# Patient Record
Sex: FEMALE | NOT HISPANIC OR LATINO | Employment: FULL TIME | URBAN - METROPOLITAN AREA
[De-identification: names, ages, dates, MRNs, and addresses within clinical notes are randomized per-mention and may not be internally consistent; named-entity substitution may affect disease eponyms.]

---

## 2017-01-14 PROBLEM — D64.9 ANEMIA, UNSPECIFIED TYPE: Status: ACTIVE | Noted: 2017-01-14

## 2017-01-14 PROBLEM — E03.9 HYPOTHYROIDISM, UNSPECIFIED TYPE: Status: ACTIVE | Noted: 2017-01-14

## 2017-01-16 DIAGNOSIS — E11.9 TYPE 2 DIABETES MELLITUS WITHOUT COMPLICATION (H): Primary | ICD-10-CM

## 2017-01-20 ENCOUNTER — TELEPHONE (OUTPATIENT)
Dept: INTERNAL MEDICINE | Facility: CLINIC | Age: 57
End: 2017-01-20

## 2017-01-20 DIAGNOSIS — Z79.4 TYPE 2 DIABETES MELLITUS WITHOUT COMPLICATION, WITH LONG-TERM CURRENT USE OF INSULIN (H): Primary | ICD-10-CM

## 2017-01-20 DIAGNOSIS — E11.9 TYPE 2 DIABETES MELLITUS WITHOUT COMPLICATION, WITH LONG-TERM CURRENT USE OF INSULIN (H): Primary | ICD-10-CM

## 2017-01-20 RX ORDER — INSULIN GLARGINE 100 [IU]/ML
INJECTION, SOLUTION SUBCUTANEOUS
Qty: 45 ML | Refills: 0 | Status: SHIPPED | OUTPATIENT
Start: 2017-01-20 | End: 2017-03-16

## 2017-01-20 NOTE — TELEPHONE ENCOUNTER
Reason for Call:  Medication or medication refill:    Do you use a [facility]?  Name of the pharmacy and phone number for the current request:  [facility] fax [phone]    Name of the medication requested: Basaglar    Other request: [facility] pharmacy called to request a prescription for basaglar, the pharmacist said that lantus is not covered by pt insurance anymore. Please send medications requestion ASAP     Can we leave a detailed message on this number? YES    Phone number patient can be reached at: Other phone number:  [phone]    Best Time: anytime    Call taken on 1/20/2017 at 4:42 PM by LINDSEY CABRAL

## 2017-01-21 NOTE — TELEPHONE ENCOUNTER
Rx changed from Lantus to Basaglar insulin with same dosing. Pt also due for fasting labs and /u with MD in the next 1 month. Pt to schedule appts including have labs done at least 2 days prior to appt with MD so can review results when MD  Sees pt in clinic. Check blood sugars 4 times a day (before meals and bedtime) for the 3 days prior to the appointment and bring these results to the appointment.  Rx faxed, Inform pt

## 2017-03-08 DIAGNOSIS — Z79.4 TYPE 2 DIABETES MELLITUS WITHOUT COMPLICATION, WITH LONG-TERM CURRENT USE OF INSULIN (H): Primary | ICD-10-CM

## 2017-03-08 DIAGNOSIS — E11.9 TYPE 2 DIABETES MELLITUS WITHOUT COMPLICATION, WITH LONG-TERM CURRENT USE OF INSULIN (H): Primary | ICD-10-CM

## 2017-03-08 NOTE — TELEPHONE ENCOUNTER
Pt overdue for fasting labs and was a no show for recent appt. Insulin filled for 1 pack of pens  (5 pends) and no additional refill. Call pt. WIll need  Fasting lab appt and  appt with MD at least a few days after labs done before  Will do additional refills after this. Pt was also informed of of this in tele message 1/20/17

## 2017-03-08 NOTE — TELEPHONE ENCOUNTER
insulin lispro (HUMALOG KWIKPEN) 100 UNIT/ML soln         Last Written Prescription Date: 8/16/2016  Last Fill Quantity: 3mL, # refills: 4  Last Office Visit with [facility], [facility] or [facility] prescribing provider:  7/07/2016        BP Readings from Last 3 Encounters:   07/07/16 124/82   03/03/16 112/66   10/06/15 159/90     Lab Results   Component Value Date    MICROL 29 07/07/2016     No results found for: MICROALBUMIN  Creatinine   Date Value Ref Range Status   07/07/2016 0.73 0.52 - 1.04 mg/dL Final   ]  GFR Estimate   Date Value Ref Range Status   07/07/2016 82 >60 mL/min/1.7m2 Final     Comment:     Non  GFR Calc   06/26/2015 82 >60 mL/min/1.7m2 Final     Comment:     Non  GFR Calc   02/26/2013 >90 >60 mL/min/1.7m2 Final     GFR Estimate If Black   Date Value Ref Range Status   07/07/2016 >90   GFR Calc   >60 mL/min/1.7m2 Final   06/26/2015 >90   GFR Calc   >60 mL/min/1.7m2 Final   02/26/2013 >90 >60 mL/min/1.7m2 Final     Lab Results   Component Value Date    CHOL 165 07/07/2016     Lab Results   Component Value Date    HDL 41 07/07/2016     Lab Results   Component Value Date    LDL 88 07/07/2016     Lab Results   Component Value Date    TRIG 181 07/07/2016     Lab Results   Component Value Date    CHOLHDLRATIO 4.1 06/26/2015     Lab Results   Component Value Date    AST 19 07/07/2016     Lab Results   Component Value Date    ALT 31 07/07/2016     Lab Results   Component Value Date    A1C 7.7 07/07/2016    A1C 7.1 06/26/2015    A1C 7.3 02/26/2013    A1C 11.6 10/31/2011    A1C 9.6 06/21/2011     Potassium   Date Value Ref Range Status   07/07/2016 3.6 3.4 - 5.3 mmol/L Final

## 2017-03-08 NOTE — TELEPHONE ENCOUNTER
Routing refill request to provider for review/approval because:  Patient needs to be seen because:  Been more than 6 months since last office visit.  Due for labs  No showed 2/23/17 appt.

## 2017-03-15 DIAGNOSIS — D64.9 ANEMIA, UNSPECIFIED TYPE: ICD-10-CM

## 2017-03-15 DIAGNOSIS — Z79.4 TYPE 2 DIABETES MELLITUS WITHOUT COMPLICATION, WITH LONG-TERM CURRENT USE OF INSULIN (H): ICD-10-CM

## 2017-03-15 DIAGNOSIS — E11.9 TYPE 2 DIABETES MELLITUS WITHOUT COMPLICATION, WITH LONG-TERM CURRENT USE OF INSULIN (H): ICD-10-CM

## 2017-03-15 DIAGNOSIS — E03.9 HYPOTHYROIDISM, UNSPECIFIED TYPE: ICD-10-CM

## 2017-03-15 LAB
ALBUMIN SERPL-MCNC: 3.7 G/DL (ref 3.4–5)
ALP SERPL-CCNC: 133 U/L (ref 40–150)
ALT SERPL W P-5'-P-CCNC: 29 U/L (ref 0–50)
ANION GAP SERPL CALCULATED.3IONS-SCNC: 7 MMOL/L (ref 3–14)
AST SERPL W P-5'-P-CCNC: 13 U/L (ref 0–45)
BILIRUB SERPL-MCNC: 0.5 MG/DL (ref 0.2–1.3)
BUN SERPL-MCNC: 13 MG/DL (ref 7–30)
CALCIUM SERPL-MCNC: 8.7 MG/DL (ref 8.5–10.1)
CHLORIDE SERPL-SCNC: 104 MMOL/L (ref 94–109)
CHOLEST SERPL-MCNC: 203 MG/DL
CO2 SERPL-SCNC: 29 MMOL/L (ref 20–32)
CREAT SERPL-MCNC: 0.79 MG/DL (ref 0.52–1.04)
GFR SERPL CREATININE-BSD FRML MDRD: 75 ML/MIN/1.7M2
GLUCOSE SERPL-MCNC: 212 MG/DL (ref 70–99)
HBA1C MFR BLD: 8.6 % (ref 4.3–6)
HDLC SERPL-MCNC: 44 MG/DL
HGB BLD-MCNC: 12.4 G/DL (ref 11.7–15.7)
IRON SATN MFR SERPL: 23 % (ref 15–46)
IRON SERPL-MCNC: 69 UG/DL (ref 35–180)
LDLC SERPL CALC-MCNC: 130 MG/DL
NONHDLC SERPL-MCNC: 159 MG/DL
POTASSIUM SERPL-SCNC: 3.8 MMOL/L (ref 3.4–5.3)
PROT SERPL-MCNC: 7.9 G/DL (ref 6.8–8.8)
SODIUM SERPL-SCNC: 140 MMOL/L (ref 133–144)
T4 FREE SERPL-MCNC: 0.95 NG/DL (ref 0.76–1.46)
TIBC SERPL-MCNC: 302 UG/DL (ref 240–430)
TRIGL SERPL-MCNC: 146 MG/DL
TSH SERPL DL<=0.005 MIU/L-ACNC: 5.12 MU/L (ref 0.4–4)

## 2017-03-15 PROCEDURE — 83550 IRON BINDING TEST: CPT | Performed by: INTERNAL MEDICINE

## 2017-03-15 PROCEDURE — 80061 LIPID PANEL: CPT | Performed by: INTERNAL MEDICINE

## 2017-03-15 PROCEDURE — 36415 COLL VENOUS BLD VENIPUNCTURE: CPT | Performed by: INTERNAL MEDICINE

## 2017-03-15 PROCEDURE — 84443 ASSAY THYROID STIM HORMONE: CPT | Performed by: INTERNAL MEDICINE

## 2017-03-15 PROCEDURE — 84439 ASSAY OF FREE THYROXINE: CPT | Performed by: INTERNAL MEDICINE

## 2017-03-15 PROCEDURE — 85018 HEMOGLOBIN: CPT | Performed by: INTERNAL MEDICINE

## 2017-03-15 PROCEDURE — 83036 HEMOGLOBIN GLYCOSYLATED A1C: CPT | Performed by: INTERNAL MEDICINE

## 2017-03-15 PROCEDURE — 80053 COMPREHEN METABOLIC PANEL: CPT | Performed by: INTERNAL MEDICINE

## 2017-03-15 PROCEDURE — 83540 ASSAY OF IRON: CPT | Performed by: INTERNAL MEDICINE

## 2017-03-16 ENCOUNTER — OFFICE VISIT (OUTPATIENT)
Dept: INTERNAL MEDICINE | Facility: CLINIC | Age: 57
End: 2017-03-16
Payer: COMMERCIAL

## 2017-03-16 DIAGNOSIS — I10 ESSENTIAL HYPERTENSION: ICD-10-CM

## 2017-03-16 DIAGNOSIS — K21.9 GASTROESOPHAGEAL REFLUX DISEASE, ESOPHAGITIS PRESENCE NOT SPECIFIED: ICD-10-CM

## 2017-03-16 DIAGNOSIS — E66.09 NON MORBID OBESITY DUE TO EXCESS CALORIES: ICD-10-CM

## 2017-03-16 DIAGNOSIS — Z11.59 NEED FOR HEPATITIS C SCREENING TEST: ICD-10-CM

## 2017-03-16 DIAGNOSIS — Z79.4 TYPE 2 DIABETES MELLITUS WITHOUT COMPLICATION, WITH LONG-TERM CURRENT USE OF INSULIN (H): Primary | ICD-10-CM

## 2017-03-16 DIAGNOSIS — E11.9 TYPE 2 DIABETES MELLITUS WITHOUT COMPLICATION, WITH LONG-TERM CURRENT USE OF INSULIN (H): Primary | ICD-10-CM

## 2017-03-16 DIAGNOSIS — E78.5 HYPERLIPIDEMIA WITH TARGET LDL LESS THAN 100: ICD-10-CM

## 2017-03-16 PROCEDURE — 99214 OFFICE O/P EST MOD 30 MIN: CPT | Performed by: INTERNAL MEDICINE

## 2017-03-16 RX ORDER — ATORVASTATIN CALCIUM 10 MG/1
10 TABLET, FILM COATED ORAL DAILY
Qty: 30 TABLET | Refills: 11 | Status: SHIPPED | OUTPATIENT
Start: 2017-03-16 | End: 2018-01-16

## 2017-03-16 RX ORDER — INSULIN GLARGINE 100 [IU]/ML
INJECTION, SOLUTION SUBCUTANEOUS
Qty: 45 ML | Refills: 3 | Status: SHIPPED | OUTPATIENT
Start: 2017-03-16 | End: 2018-01-16

## 2017-03-16 NOTE — MR AVS SNAPSHOT
After Visit Summary   3/16/2017    Devi Edwards    MRN: [id number]           Patient Information     Date Of Birth          1960        Visit Information        Provider Department      3/16/2017 11:30 AM Emanuel Mcclure MD [facility]        Today's Diagnoses     Gastroesophageal reflux disease, esophagitis presence not specified    -  1    Type 2 diabetes mellitus without complication, with long-term current use of insulin (H)          Care Instructions    Continue Basaglar dosing  Cointinue Humalog  With 3-5 units with meals and also sliding scale  Use the following  Sliding scale for Humalog with meals:  Sugar 140-180, 1  Additional unit of Humalog  Sugar 181-230, 2  Additional unit of Humalog  Sugar 231-280, 3  Additional unit of Humalog  Sugar 281-330, 5  Additional unit of Humalog  Sugar 331-380, 7  Additional unit of Humalog  Sugar 381-420, 9  Additional unit of Humalog  Sugar > 420, call MD    For bedtime, use the following Humalog sliding scale:  200-250, 1 unit  251-300, 2 units  301-350 3 units  351-400, 4 units    Omeprazole 20mg tab, 1 tab daily in AM about 30 min before breakfast for acid reflux  Atorvastastin 10mg tab, 1 tab daily for cholesterol  FAsting labs in late April for cholesterol, liver. See me a few days later to review reuslts and blood sugars. Check blood sugars 4 times a day (before meals and bedtime) for the 3 days prior to the appointment and bring these results to the appointment.  Nonfasting A1C lab test in late June for diabetes            Follow-ups after your visit        Who to contact     If you have questions or need follow up information about today's clinic visit or your schedule please contact [facility] directly at [phone].  Normal or non-critical lab and imaging results will be communicated to you by MyChart, letter or phone within 4 business days after the clinic has received the results. If you do  "not hear from us within 7 days, please contact the clinic through [employer] or phone. If you have a critical or abnormal lab result, we will notify you by phone as soon as possible.  Submit refill requests through [employer] or call your pharmacy and they will forward the refill request to us. Please allow 3 business days for your refill to be completed.          Additional Information About Your Visit        [employer] Information     [employer] lets you send messages to your doctor, view your test results, renew your prescriptions, schedule appointments and more. To sign up, go to [email or web address] . Click on \"Log in\" on the left side of the screen, which will take you to the Welcome page. Then click on \"Sign up Now\" on the right side of the page.     You will be asked to enter the access code listed below, as well as some personal information. Please follow the directions to create your username and password.     Your access code is: [email or web address]  Expires: 2017 12:14 PM     Your access code will  in 90 days. If you need help or a new code, please call your [facility] clinic or [phone].        Care EveryWhere ID     This is your Care EveryWhere ID. This could be used by other organizations to access your [facility] medical records  [id number]        Your Vitals Were     Pulse Temperature Height Last Period Pulse Oximetry BMI (Body Mass Index)    76 98  F (36.7  C) (Oral) 5' 4\" (1.626 m) 07/10/2009 96% 33.3 kg/m2       Blood Pressure from Last 3 Encounters:   17 (!) 142/92   16 124/82   16 112/66    Weight from Last 3 Encounters:   17 194 lb (88 kg)   16 188 lb (85.3 kg)   16 190 lb (86.2 kg)              Today, you had the following     No orders found for display         Today's Medication Changes          These changes are accurate as of: 3/16/17 12:14 PM.  If you have any questions, ask your nurse or doctor.               Start taking these medicines.        " Dose/Directions    omeprazole 20 MG CR capsule   Commonly known as:  priLOSEC   Used for:  Gastroesophageal reflux disease, esophagitis presence not specified   Started by:  Emanuel Mcclure MD        Dose:  20 mg   Take 1 capsule (20 mg) by mouth daily   Quantity:  30 capsule   Refills:  11         These medicines have changed or have updated prescriptions.        Dose/Directions    insulin lispro 100 UNIT/ML injection   Commonly known as:  HumaLOG KWIKpen   This may have changed:  additional instructions   Used for:  Type 2 diabetes mellitus without complication, with long-term current use of insulin (H)   Changed by:  Emanuel Mcclure MD        4-6 units  with each meal plus sliding scale coverage. Average 35 units/day   Quantity:  45 mL   Refills:  3       insulin pen needle 31G X 8 MM   This may have changed:  how to take this   Used for:  Type 2 diabetes mellitus without complication, with long-term current use of insulin (H)   Changed by:  Emanuel Mcclure MD        4 injections per day   Quantity:  400 each   Refills:  3       metFORMIN 1000 MG tablet   Commonly known as:  GLUCOPHAGE   This may have changed:  how much to take   Used for:  Type 2 diabetes mellitus without complication, with long-term current use of insulin (H)   Changed by:  Emanuel Mcclure MD        Dose:  1000 mg   Take 1 tablet (1,000 mg) by mouth 2 times daily (with meals)   Quantity:  180 tablet   Refills:  3         Stop taking these medicines if you haven't already. Please contact your care team if you have questions.     lidocaine 2 % solution   Commonly known as:  XYLOCAINE   Stopped by:  Emanuel Mcclure MD                Where to get your medicines      These medications were sent to [facility] [address] [place] [address] [address] [facility]  [address] [facility] [address]     Phone:  [phone]     blood glucose monitoring test strip    insulin glargine 100 UNIT/ML injection    insulin lispro 100 UNIT/ML injection     insulin pen needle 31G X 8 MM    metFORMIN 1000 MG tablet    omeprazole 20 MG CR capsule                Primary Care Provider Office Phone # Fax #    Emanuel Mcclure [phone]       [facility] [address] [facility] [address]        Thank you!     Thank you for choosing [facility]  for your care. Our goal is always to provide you with excellent care. Hearing back from our patients is one way we can continue to improve our services. Please take a few minutes to complete the written survey that you may receive in the mail after your visit with us. Thank you!             Your Updated Medication List - Protect others around you: Learn how to safely use, store and throw away your medicines at www.disposemymeds.org.          This list is accurate as of: 3/16/17 12:14 PM.  Always use your most recent med list.                   Brand Name Dispense Instructions for use    ACE/ARB NOT PRESCRIBED (INTENTIONAL)      ACE & ARB not prescribed due to Intolerance       aspirin 81 MG tablet          ONE DAILY       blood glucose monitoring test strip    no brand specified    300 each    Per glucometer brand to match . Check sugars TID       insulin glargine 100 UNIT/ML injection     45 mL    50 units at bedtime       insulin lispro 100 UNIT/ML injection    HumaLOG KWIKpen    45 mL    4-6 units  with each meal plus sliding scale coverage. Average 35 units/day       insulin pen needle 31G X 8 MM     400 each    4 injections per day       loratadine 10 MG tablet    CLARITIN    30 tablet    Take 1 tablet (10 mg) by mouth daily       metFORMIN 1000 MG tablet    GLUCOPHAGE    180 tablet    Take 1 tablet (1,000 mg) by mouth 2 times daily (with meals)       omeprazole 20 MG CR capsule    priLOSEC    30 capsule    Take 1 capsule (20 mg) by mouth daily       vitamin D 2000 UNITS tablet     100 tablet    Take 2,000 Units by mouth daily.

## 2017-03-16 NOTE — NURSING NOTE
"Chief Complaint   Patient presents with     Diabetes       Initial BP (!) 142/92  Pulse 76  Temp 98  F (36.7  C) (Oral)  Ht 5' 4\" (1.626 m)  Wt 194 lb (88 kg)  LMP 07/10/2009  SpO2 96%  BMI 33.3 kg/m2 Estimated body mass index is 33.3 kg/(m^2) as calculated from the following:    Height as of this encounter: 5' 4\" (1.626 m).    Weight as of this encounter: 194 lb (88 kg).  Medication Reconciliation: complete    "

## 2017-03-16 NOTE — PROGRESS NOTES
SUBJECTIVE:                                                    Devi Edwards is a 57 year old female who presents to clinic today for the following health issues:      Diabetes Follow-up    Patient is checking blood sugars: 10 times daily.    Blood sugar testing frequency justification: Uncontrolled diabetes  Results are as follows:         Reading have been 100-280.  Patient believes that her current glucometer is not working. When taking levels on her daughters machine levels are more on a normal scale    Diabetic concerns:  blood sugar frequently over 200     Symptoms of hypoglycemia (low blood sugar): none     Paresthesias (numbness or burning in feet) or sores: No     Date of last diabetic eye exam: about 2 years ago       Amount of exercise or physical activity: 6-7 days/week for an average of 30-45 minutes    Problems taking medications regularly: No    Medication side effects: none    Diet: diabetic    Pt's past medical history, family history, habits, medications and allergies were reviewed with the patient today.  See snap shot for  HCM status. Most recent lab results reviewed with pt. Problem list and histories reviewed & adjusted, as indicated.  Additional history as below:    Denies CP, SOB, abdominal pain, polyuria, polydipsia, vision changes, extremity numbness/parasthesias or skin problems.  HAs a lot of stress recently with daughter being in hospital with sepsis.  Pt denies depression. Sleeping OK overall. Not on statin therapy currently. HAs a lot of GERD-like sx. Denies blood in stools. No NSAIDS use or epigastric pain  Moderate compliance with diet. Harder when at hospital visiting daughter. Due for eye exam    Lab Results   Component Value Date     03/15/2017     Lab Results   Component Value Date    A1C 8.6 03/15/2017     Lab Results   Component Value Date    CHOL 203 03/15/2017     Lab Results   Component Value Date     03/15/2017     Lab Results   Component Value Date    HDL  "44 03/15/2017     Lab Results   Component Value Date    TRIG 146 03/15/2017     Lab Results   Component Value Date    CR 0.79 03/15/2017     Lab Results   Component Value Date    ALT 29 03/15/2017     Lab Results   Component Value Date    AST 13 03/15/2017     Lab Results   Component Value Date    MICROL 29 07/07/2016     Lab Results   Component Value Date    TSH 5.12 03/15/2017          Additional ROS:   Constitutional, HEENT, Cardiovascular, Pulmonary, GI and , Neuro, MSK and Psych review of systems/symptoms are otherwise negative or unchanged from previous, except as noted above.      OBJECTIVE:  /84  Pulse 76  Temp 98  F (36.7  C) (Oral)  Ht 5' 4\" (1.626 m)  Wt 194 lb (88 kg)  LMP 07/10/2009  SpO2 96%  BMI 33.3 kg/m2   Estimated body mass index is 33.3 kg/(m^2) as calculated from the following:    Height as of this encounter: 5' 4\" (1.626 m).    Weight as of this encounter: 194 lb (88 kg).  Eye: PERRL, EOMI  HENT: ear canals and TM's normal and nose and mouth without ulcers or lesions   Neck: no adenopathy. Thyroid normal to palpation. No bruits  Pulm: Lungs clear to auscultation   CV: Regular rates and rhythm  GI: Soft, mildly obese, nontender, Normal active bowel sounds, No hepatosplenomegaly or masses palpable  Ext: Peripheral pulses intact. No edema.  Neuro: Normal strength and tone, sensory exam grossly normal  Gen: Occ tearful talking about daughter      Assessment/Plan: (See plan discussion below for further details)  1. Type 2 diabetes mellitus without complication, with long-term current use of insulin (H)  Needs improved control. See plan discussion below  - metFORMIN (GLUCOPHAGE) 1000 MG tablet; Take 1 tablet (1,000 mg) by mouth 2 times daily (with meals)  Dispense: 180 tablet; Refill: 3  - insulin glargine (BASAGLAR KWIKPEN) 100 UNIT/ML injection; 50 units at bedtime  Dispense: 45 mL; Refill: 3  - insulin lispro (HUMALOG KWIKPEN) 100 UNIT/ML injection; 4-6 units  with each meal plus " sliding scale coverage. Average 35 units/day  Dispense: 45 mL; Refill: 3  - blood glucose monitoring (NO BRAND SPECIFIED) test strip; Per glucometer brand to match . Check sugars TID  Dispense: 300 each; Refill: 3  - insulin pen needle 31G X 8 MM; 4 injections per day  Dispense: 400 each; Refill: 3  - Hemoglobin A1c; Future    2. Gastroesophageal reflux disease, esophagitis presence not specified  Will try shortterm PPI. ? Related to stress issues. Avoid caffeine  - omeprazole (PRILOSEC) 20 MG CR capsule; Take 1 capsule (20 mg) by mouth daily  Dispense: 30 capsule; Refill: 11    3. Hyperlipidemia with target LDL less than 100  Start statin therapy as below  - atorvastatin (LIPITOR) 10 MG tablet; Take 1 tablet (10 mg) by mouth daily  Dispense: 30 tablet; Refill: 11  - Hepatic panel; Future  - Lipid panel reflex to direct LDL; Future  - CK total; Future    4. Essential hypertension  stable    5. Non morbid obesity due to excess calories  Calorie/carbohydrate (sugar, bread, potato, pasta, etc) reduction in diet for weight loss.  Increase color on your plate with fruits and vegetables. Increase  frequency of walking or other aerobic exercise as able (goal is daily)    6. Need for hepatitis C screening test  Pt meets criteria for hepatitis C screening based on birth year 1945-65. Discussed pro/cons of Hep C screening/treatment and recs of USPTF. Pt agreeable to screening  - Hepatitis C Screen Reflex to HCV RNA Quant and Genotype; Future      Plan discussion:  Continue Basaglar dosing  Cointinue Humalog  With 3-5 units with meals and also sliding scale  Use the following  Sliding scale for Humalog with meals:  Sugar 140-180, 1  Additional unit of Humalog  Sugar 181-230, 2  Additional unit of Humalog  Sugar 231-280, 3  Additional unit of Humalog  Sugar 281-330, 5  Additional unit of Humalog  Sugar 331-380, 7  Additional unit of Humalog  Sugar 381-420, 9  Additional unit of Humalog  Sugar > 420, call MD    For bedtime, use  the following Humalog sliding scale:  200-250, 1 unit  251-300, 2 units  301-350 3 units  351-400, 4 units    Omeprazole 20mg tab, 1 tab daily in AM about 30 min before breakfast for acid reflux  Atorvastastin 10mg tab, 1 tab daily for cholesterol  Fasting labs in late April for cholesterol, liver. See me a few days later to review reuslts and blood sugars. Check blood sugars 4 times a day (before meals and bedtime) for the 3 days prior to the appointment and bring these results to the appointment.  Nonfasting A1C lab test in late June for diabetes  Eye exam  Schedule appt with Dr Alvarado for follow-up abnormal pap           Emanuel Mcclure MD  Internal Medicine Department  [facility]

## 2017-03-16 NOTE — PATIENT INSTRUCTIONS
Continue Basaglar dosing  Cointinue Humalog  With 3-5 units with meals and also sliding scale  Use the following  Sliding scale for Humalog with meals:  Sugar 140-180, 1  Additional unit of Humalog  Sugar 181-230, 2  Additional unit of Humalog  Sugar 231-280, 3  Additional unit of Humalog  Sugar 281-330, 5  Additional unit of Humalog  Sugar 331-380, 7  Additional unit of Humalog  Sugar 381-420, 9  Additional unit of Humalog  Sugar > 420, call MD    For bedtime, use the following Humalog sliding scale:  200-250, 1 unit  251-300, 2 units  301-350 3 units  351-400, 4 units    Omeprazole 20mg tab, 1 tab daily in AM about 30 min before breakfast for acid reflux  Atorvastastin 10mg tab, 1 tab daily for cholesterol  Fasting labs in late April for cholesterol, liver. See me a few days later to review reuslts and blood sugars. Check blood sugars 4 times a day (before meals and bedtime) for the 3 days prior to the appointment and bring these results to the appointment.  Nonfasting A1C lab test in late June for diabetes  Eye exam  Schedule appt with Dr Alvarado for follow-up abnormal pap

## 2017-03-27 VITALS
DIASTOLIC BLOOD PRESSURE: 84 MMHG | WEIGHT: 194 LBS | SYSTOLIC BLOOD PRESSURE: 138 MMHG | TEMPERATURE: 98 F | OXYGEN SATURATION: 96 % | BODY MASS INDEX: 33.12 KG/M2 | HEIGHT: 64 IN | HEART RATE: 76 BPM

## 2017-05-05 DIAGNOSIS — Z12.11 COLON CANCER SCREENING: Primary | ICD-10-CM

## 2017-05-30 DIAGNOSIS — Z79.4 TYPE 2 DIABETES MELLITUS WITHOUT COMPLICATION, WITH LONG-TERM CURRENT USE OF INSULIN (H): ICD-10-CM

## 2017-05-30 DIAGNOSIS — E11.9 TYPE 2 DIABETES MELLITUS WITHOUT COMPLICATION, WITH LONG-TERM CURRENT USE OF INSULIN (H): ICD-10-CM

## 2017-05-31 RX ORDER — INSULIN GLARGINE 100 [IU]/ML
INJECTION, SOLUTION SUBCUTANEOUS
Refills: 0 | OUTPATIENT
Start: 2017-05-31

## 2017-06-22 DIAGNOSIS — Z79.4 TYPE 2 DIABETES MELLITUS WITHOUT COMPLICATION, WITH LONG-TERM CURRENT USE OF INSULIN (H): ICD-10-CM

## 2017-06-22 DIAGNOSIS — E11.9 TYPE 2 DIABETES MELLITUS WITHOUT COMPLICATION, WITH LONG-TERM CURRENT USE OF INSULIN (H): ICD-10-CM

## 2017-06-23 RX ORDER — INSULIN LISPRO 100 [IU]/ML
INJECTION, SOLUTION INTRAVENOUS; SUBCUTANEOUS
Qty: 15 ML | Refills: 2 | Status: SHIPPED | OUTPATIENT
Start: 2017-06-23 | End: 2017-11-27

## 2017-08-26 ENCOUNTER — HEALTH MAINTENANCE LETTER (OUTPATIENT)
Age: 57
End: 2017-08-26

## 2017-11-27 DIAGNOSIS — E11.9 TYPE 2 DIABETES MELLITUS WITHOUT COMPLICATION, WITH LONG-TERM CURRENT USE OF INSULIN (H): ICD-10-CM

## 2017-11-27 DIAGNOSIS — Z79.4 TYPE 2 DIABETES MELLITUS WITHOUT COMPLICATION, WITH LONG-TERM CURRENT USE OF INSULIN (H): ICD-10-CM

## 2017-11-27 NOTE — LETTER
[facility]  [address] [address]  [phone]            Devi Edwards  [address]  [facility] [address]        November 29, 2017    Dear Devi,    While refilling your prescription today, we noticed that you are due to have labs drawn and see your provider, fasting labs.  We will refill your prescription for 30 days, but a follow-up appointment must be made before any additional refills can be approved.     Taking care of your health is important to us and we look forward to seeing you in the near future.  Please call us at [phone] or [phone] (or use [employer]) to schedule an appointment.     Please disregard this notice if you have already made an appointment.    Sincerely,        [facility]

## 2017-11-29 RX ORDER — INSULIN LISPRO 100 [IU]/ML
INJECTION, SOLUTION INTRAVENOUS; SUBCUTANEOUS
Qty: 15 ML | Refills: 0 | Status: SHIPPED | OUTPATIENT
Start: 2017-11-29 | End: 2018-01-03

## 2017-11-29 NOTE — TELEPHONE ENCOUNTER
Medication is being filled for 1 time refill only due to:  Patient needs labs and office visit letter sent .

## 2018-01-03 DIAGNOSIS — Z79.4 TYPE 2 DIABETES MELLITUS WITHOUT COMPLICATION, WITH LONG-TERM CURRENT USE OF INSULIN (H): ICD-10-CM

## 2018-01-03 DIAGNOSIS — E11.9 TYPE 2 DIABETES MELLITUS WITHOUT COMPLICATION, WITH LONG-TERM CURRENT USE OF INSULIN (H): ICD-10-CM

## 2018-01-03 RX ORDER — INSULIN LISPRO 100 [IU]/ML
INJECTION, SOLUTION INTRAVENOUS; SUBCUTANEOUS
Qty: 15 ML | Refills: 0 | Status: SHIPPED | OUTPATIENT
Start: 2018-01-03 | End: 2018-01-16

## 2018-01-03 NOTE — TELEPHONE ENCOUNTER
Routing refill request to provider for review/approval because:  Adrianne given x1 and patient did not follow up, please advise  Labs not current:  fasting  Patient needs to be seen because:  Its been greater than 6 months since last office visit

## 2018-01-03 NOTE — TELEPHONE ENCOUNTER
Call pt. Sooue for fasting labs and appt with MD in clinic. RF done for 5 insulin pens and will not be refilled again without. Pt to have a fasting lab appt in the next 30 days and then see me  A few days after the labs are done to review results and blood sugars. Check blood sugars four times a day (before meals and bedtime) for the 4 days prior to the appointment and bring these results to the appointment. Assist pt in getting appts scheduled

## 2018-01-03 NOTE — LETTER
[facility]  [address] [address]  [phone]            Devi Edwards  [address]  [facility] [address]        January 12, 2018    Dear Devi,    While refilling your prescription today, we noticed that you are due to have labs drawn and see your provider.  We will refill your prescription for 30 days, but a follow-up appointment must be made before any additional refills can be approved.     Taking care of your health is important to us and we look forward to seeing you in the near future.  Please call us at [phone] or [phone] (or use [employer]) to schedule an appointment.     Please disregard this notice if you have already made an appointment.    Sincerely,        [facility]

## 2018-01-03 NOTE — TELEPHONE ENCOUNTER
Requested Prescriptions   Pending Prescriptions Disp Refills     HUMALOG KWIKPEN 100 UNIT/ML soln [Pharmacy Med Name: HUMALOG KWIKPEN 100UNIT/ML INJ]  PATIENT NEEDS LABS AND OFFICE VISIT.  Last Written Prescription Date:  11/29/17  Last Fill Quantity: 15ML,  # refills: 0   Last Office Visit with [facility], [facility] or [facility] prescribing provider:  3/16/17   Future Office Visit:       0     Sig: INJECT 2-4 UNITS SUBCUTANEOUSLY WITH EACH MEAL. PATIENT DUE FOR LABS AND OFFICE VISIT.    Short Acting Insulin Protocol Failed    1/3/2018  3:16 PM       Failed - Blood pressure less than 140/90 in past 6 months    BP Readings from Last 3 Encounters:   03/16/17 138/84   07/07/16 124/82   03/03/16 112/66                Failed - Microalbumin on file in past 12 months    Recent Labs   Lab Test  07/07/16   1125   MICROL  29   UMALCR  11.51            Failed - HgbA1C in past 3 or 6 months    Recent Labs   Lab Test  03/15/17   1143   A1C  8.6*            Failed - Recent visit with authorizing provider's specialty in past 6 months    Patient had office visit in the last 6 months or has a visit in the next 30 days with authorizing provider.  See chart review.            Passed - LDL on file in past 12 months    Recent Labs   Lab Test  03/15/17   1143   LDL  130*            Passed - Serum creatinine on file in past 12 months    Recent Labs   Lab Test  03/15/17   1143   CR  0.79            Passed - Patient is age 18 or older

## 2018-01-16 ENCOUNTER — OFFICE VISIT (OUTPATIENT)
Dept: INTERNAL MEDICINE | Facility: CLINIC | Age: 58
End: 2018-01-16
Payer: COMMERCIAL

## 2018-01-16 VITALS
DIASTOLIC BLOOD PRESSURE: 80 MMHG | HEIGHT: 64 IN | HEART RATE: 86 BPM | OXYGEN SATURATION: 98 % | TEMPERATURE: 98.4 F | SYSTOLIC BLOOD PRESSURE: 134 MMHG

## 2018-01-16 DIAGNOSIS — Z12.11 SPECIAL SCREENING FOR MALIGNANT NEOPLASMS, COLON: ICD-10-CM

## 2018-01-16 DIAGNOSIS — R87.612 LOW GRADE SQUAMOUS INTRAEPITHELIAL LESION ON CYTOLOGIC SMEAR OF CERVIX (LGSIL): ICD-10-CM

## 2018-01-16 DIAGNOSIS — Z11.59 NEED FOR HEPATITIS C SCREENING TEST: ICD-10-CM

## 2018-01-16 DIAGNOSIS — K21.9 GASTROESOPHAGEAL REFLUX DISEASE, ESOPHAGITIS PRESENCE NOT SPECIFIED: ICD-10-CM

## 2018-01-16 DIAGNOSIS — Z23 NEED FOR PROPHYLACTIC VACCINATION AND INOCULATION AGAINST INFLUENZA: ICD-10-CM

## 2018-01-16 DIAGNOSIS — Z79.4 TYPE 2 DIABETES MELLITUS WITHOUT COMPLICATION, WITH LONG-TERM CURRENT USE OF INSULIN (H): ICD-10-CM

## 2018-01-16 DIAGNOSIS — E11.9 TYPE 2 DIABETES MELLITUS WITHOUT COMPLICATION, WITH LONG-TERM CURRENT USE OF INSULIN (H): ICD-10-CM

## 2018-01-16 DIAGNOSIS — Z00.01 ENCOUNTER FOR ROUTINE ADULT MEDICAL EXAM WITH ABNORMAL FINDINGS: Primary | ICD-10-CM

## 2018-01-16 DIAGNOSIS — Z12.31 ENCOUNTER FOR SCREENING MAMMOGRAM FOR BREAST CANCER: ICD-10-CM

## 2018-01-16 DIAGNOSIS — E78.5 HYPERLIPIDEMIA WITH TARGET LDL LESS THAN 100: ICD-10-CM

## 2018-01-16 LAB
ALBUMIN SERPL-MCNC: 3.8 G/DL (ref 3.4–5)
ALP SERPL-CCNC: 109 U/L (ref 40–150)
ALT SERPL W P-5'-P-CCNC: 37 U/L (ref 0–50)
ANION GAP SERPL CALCULATED.3IONS-SCNC: 9 MMOL/L (ref 3–14)
AST SERPL W P-5'-P-CCNC: 31 U/L (ref 0–45)
BILIRUB SERPL-MCNC: 0.3 MG/DL (ref 0.2–1.3)
BUN SERPL-MCNC: 10 MG/DL (ref 7–30)
CALCIUM SERPL-MCNC: 9.1 MG/DL (ref 8.5–10.1)
CHLORIDE SERPL-SCNC: 105 MMOL/L (ref 94–109)
CO2 SERPL-SCNC: 26 MMOL/L (ref 20–32)
CREAT SERPL-MCNC: 0.83 MG/DL (ref 0.52–1.04)
GFR SERPL CREATININE-BSD FRML MDRD: 71 ML/MIN/1.7M2
GLUCOSE SERPL-MCNC: 143 MG/DL (ref 70–99)
HBA1C MFR BLD: 8.4 % (ref 4.3–6)
POTASSIUM SERPL-SCNC: 4 MMOL/L (ref 3.4–5.3)
PROT SERPL-MCNC: 7.9 G/DL (ref 6.8–8.8)
SODIUM SERPL-SCNC: 140 MMOL/L (ref 133–144)
TSH SERPL DL<=0.005 MIU/L-ACNC: 3.96 MU/L (ref 0.4–4)

## 2018-01-16 PROCEDURE — 99207 C FOOT EXAM  NO CHARGE: CPT | Mod: 25 | Performed by: INTERNAL MEDICINE

## 2018-01-16 PROCEDURE — 84443 ASSAY THYROID STIM HORMONE: CPT | Performed by: INTERNAL MEDICINE

## 2018-01-16 PROCEDURE — 99213 OFFICE O/P EST LOW 20 MIN: CPT | Mod: 25 | Performed by: INTERNAL MEDICINE

## 2018-01-16 PROCEDURE — 83036 HEMOGLOBIN GLYCOSYLATED A1C: CPT | Performed by: INTERNAL MEDICINE

## 2018-01-16 PROCEDURE — 36415 COLL VENOUS BLD VENIPUNCTURE: CPT | Performed by: INTERNAL MEDICINE

## 2018-01-16 PROCEDURE — 90471 IMMUNIZATION ADMIN: CPT | Performed by: INTERNAL MEDICINE

## 2018-01-16 PROCEDURE — 80053 COMPREHEN METABOLIC PANEL: CPT | Performed by: INTERNAL MEDICINE

## 2018-01-16 PROCEDURE — 90686 IIV4 VACC NO PRSV 0.5 ML IM: CPT | Performed by: INTERNAL MEDICINE

## 2018-01-16 PROCEDURE — 99396 PREV VISIT EST AGE 40-64: CPT | Mod: 25 | Performed by: INTERNAL MEDICINE

## 2018-01-16 PROCEDURE — 86803 HEPATITIS C AB TEST: CPT | Performed by: INTERNAL MEDICINE

## 2018-01-16 RX ORDER — INSULIN GLARGINE 100 [IU]/ML
INJECTION, SOLUTION SUBCUTANEOUS
Qty: 45 ML | Refills: 3 | Status: SHIPPED | OUTPATIENT
Start: 2018-01-16 | End: 2019-02-18

## 2018-01-16 RX ORDER — ATORVASTATIN CALCIUM 10 MG/1
10 TABLET, FILM COATED ORAL DAILY
Qty: 30 TABLET | Refills: 11 | Status: SHIPPED | OUTPATIENT
Start: 2018-01-16 | End: 2019-02-18

## 2018-01-16 ASSESSMENT — PATIENT HEALTH QUESTIONNAIRE - PHQ9: SUM OF ALL RESPONSES TO PHQ QUESTIONS 1-9: 0

## 2018-01-16 NOTE — PROGRESS NOTES
SUBJECTIVE:   CC: Devi Edwards is an 58 year old woman who presents for preventive health visit along with other medical issues    Healthy Habits:    Do you get at least three servings of calcium containing foods daily (dairy, green leafy vegetables, etc.)? yes    Amount of exercise or daily activities, outside of work: 3 day(s) per week    Problems taking medications regularly No    Medication side effects: No    Have you had an eye exam in the past two years? no    Do you see a dentist twice per year? no    Do you have sleep apnea, excessive snoring or daytime drowsiness?no          Today's PHQ-2 Score:   PHQ-2 ( 1999 Pf[employer]) 1/16/2018 3/16/2017   Q1: Little interest or pleasure in doing things 0 0   Q2: Feeling down, depressed or hopeless 0 0   PHQ-2 Score 0 0         Abuse: Current or Past(Physical, Sexual or Emotional)- No  Do you feel safe in your environment - Yes  Social History   Substance Use Topics     Smoking status: Never Smoker     Smokeless tobacco: Never Used     Alcohol use No     If you drink alcohol do you typically have >3 drinks per day or >7 drinks per week? No                     Reviewed orders with patient.  Reviewed health maintenance and updated orders accordingly - Yes  Labs reviewed in [facility]    Patient over age 50, mutual decision to screen reflected in health maintenance.      Pertinent mammograms are reviewed under the imaging tab.  History of abnormal Pap smear:  Yes. Pt overdue for f/u pap. Last 2015    Reviewed and updated as needed this visit by clinical staffTobacco  Allergies  Meds         Reviewed and updated as needed this visit by Provider            ROS:  C: NEGATIVE for fever, chills. Pt not weighed today  I: NEGATIVE for worrisome rashes, moles or lesions  E: NEGATIVE for  or irritation.  Mild reduced visual acuity.  Due for eye exam  ENT: NEGATIVE for ear, mouth and throat problems  R: NEGATIVE for significant cough or SOB  B: NEGATIVE for masses, tenderness or  "discharge  CV: NEGATIVE for chest pain, palpitations or peripheral edema  GI: NEGATIVE for nausea, abdominal pain,   or change in bowel habits.  Has GERD symptoms when stopped taking omeprazole.  Symptoms well controlled on medication  : NEGATIVE for unusual urinary or vaginal symptoms. No vaginal bleeding. Hx abnormal pap. Declines   M: NEGATIVE for significant arthralgias or myalgia that limit activity.  Mild general stiffness in the morning which improves with activity  N: NEGATIVE for weakness, dizziness or paresthesias  P:  POSITIVE for stress with her daughter's significant medical issues.  Daughter will be undergoing a liver and kidney transplant soon at the [facility]. Patient denies actual depression.   E:  POSITIVE for DM.  Last available A1c from March 2017 was 8.6.  Patient states blood sugars were variable.  Will range  in the morning but can sometimes be up to 300 later in the day if does not take Humalog insulin.  Generally using 2-4 units with meals.  Sometimes gets busy with her daughter and does not eat food.  Blood sugar was 118 this morning.  Sugars generally hold less than 180 later in the day if she does use Humalog with meals.  Patient not currently taking atorvastatin as previously prescribed.  Denies side effects with the medication previously.     OBJECTIVE:   /80  Pulse 86  Temp 98.4  F (36.9  C) (Oral)  Ht 5' 4\" (1.626 m)  LMP 07/10/2009  SpO2 98%  EXAM:  General appearance - healthy, alert, no distress  Skin - No rashes or lesions.  Head - normocephalic, atraumatic  Eyes - GIGI, EOMI, fundi exam with nondilated pupils negative.  Ears - External ears normal. Canals clear. TM's normal.  Nose/Sinuses - Nares normal. Septum midline. Mucosa normal. No drainage or sinus tenderness.  Oropharynx - No erythema, no adenopathy, no exudates.  Neck - Supple without adenopathy or thyromegaly. No bruits.  Lungs - Clear to auscultation without wheezes/rhonchi.  Heart - Regular rate " and rhythm without murmurs, clicks, or gallops.  Nodes - No supraclavicular, axillary, or inguinal adenopathy palpable.  Breasts - deferred. Will be done when pt undergoes pelvic exam at next appt  Abdomen - Abdomen soft, non-tender. BS normal. No masses or hepatosplenomegaly palpable. No bruits.  Extremities -No cyanosis, clubbing or edema.    Musculoskeletal - Spine ROM normal. Muscular strength intact.   Peripheral pulses - radial=4/4, femoral=4/4, posterior tibial=4/4, dorsalis pedis=4/4,  Neuro - Gait normal. Reflexes normal and symmetric. Sensation grossly WNL to LTS.  Genital/Rectal - deferred as pt requested the pap be done another day since wants to address other DM issues also and here with daughter today and prefers to do on day when daughter not  With her      ASSESSMENT/PLAN:   1. Encounter for routine adult medical exam with abnormal findings  Pt requests to do pelvic exam other day. See below for HCM orders    2. Type 2 diabetes mellitus without complication, with long-term current use of insulin (H)  Not controlled. See plan discussion below.  Labs as ordered  - metFORMIN (GLUCOPHAGE) 1000 MG tablet; Take 1 tablet (1,000 mg) by mouth 2 times daily (with meals)  Dispense: 180 tablet; Refill: 3  - BASAGLAR 100 UNIT/ML injection; 45 units at bedtime  Dispense: 45 mL; Refill: 3  - insulin pen needle 31G X 8 MM; 4 injections per day  Dispense: 400 each; Refill: 3  - insulin lispro (HUMALOG KWIKPEN) 100 UNIT/ML injection; INJECT 2-4 UNITS SUBCUTANEOUSLY WITH EACH MEAL.  Dispense: 15 mL; Refill: 11  - Hemoglobin A1c  - Comprehensive metabolic panel  - TSH with free T4 reflex  - blood glucose monitoring (NO BRAND SPECIFIED) meter device kit; Use to test blood sugar 3 times daily or as directed.  Dispense: 1 kit; Refill: 0  - blood glucose monitoring (NO BRAND SPECIFIED) test strip; Use to test blood sugars 3 times daily or as directed (brand per insurance coverage)  Dispense: 300 strip; Refill: 3  -  OFFICE/OUTPT VISIT,EST,LEVL III  - FOOT EXAM    3. Need for prophylactic vaccination and inoculation against influenza  - FLU VAC, SPLIT VIRUS IM > 3 YO (QUADRIVALENT) [33934]  - Vaccine Administration, Initial [36084]    4. Gastroesophageal reflux disease, esophagitis presence not specified  Controlled. Recurrent off of med. Limiting caffeine intake  - omeprazole (PRILOSEC) 20 MG CR capsule; Take 1 capsule (20 mg) by mouth daily  Dispense: 90 capsule; Refill: 3    5. Hyperlipidemia with target LDL less than 100  Needs statin therapy.  Restart Lipitor.  Repeat fasting labs one month  - atorvastatin (LIPITOR) 10 MG tablet; Take 1 tablet (10 mg) by mouth daily  Dispense: 30 tablet; Refill: 11  - Lipid panel reflex to direct LDL Fasting; Future  - Hepatic panel; Future  - CK total; Future  - OFFICE/OUTPT VISIT,EST,LEVL III    6. Need for hepatitis C screening test  Pt meets criteria for hepatitis C screening based on birth year 1945-65. Discussed pro/cons of Hep C screening/treatment and recs of USPTF. Pt agreeable to screening  - Hepatitis C Screen Reflex to HCV RNA Quant and Genotype    7. Encounter for screening mammogram for breast cancer  - *MA Screening Digital Bilateral; Future  - Fecal colorectal cancer screen FIT; Future    8. Low grade squamous intraepithelial lesion on cytologic smear of cervix (LGSIL)  Needs repeat pap. See plan below to do in 1 month when pt seen back in clinic      9. Special screening for malignant neoplasms, colon  Refuses declines despite MD recommendation for the procedure. FIT test ordered. Reviewed the reduced sensitivity of the FIT test for colon cancer screening compared to colonoscopy and pt states understanding of this  - Fecal colorectal cancer screen FIT; Future    COUNSELING:   Reviewed preventive health counseling, as reflected in patient instructions    BP Screening:   Last 3 BP Readings:    BP Readings from Last 3 Encounters:   01/16/18 134/80   03/16/17 138/84   07/07/16  "124/82       The following was recommended to the patient:  Re-screen BP within a year and recommended lifestyle modifications     reports that she has never smoked. She has never used smokeless tobacco.    Estimated body mass index is 33.3 kg/(m^2) as calculated from the following:    Height as of 3/16/17: 5' 4\" (1.626 m).    Weight as of 3/16/17: 194 lb (88 kg).   Weight management plan: Discussed healthy diet and exercise guidelines and patient will follow up in 6 months in clinic to re-evaluate.    Counseling Resources:  ATP IV Guidelines  Pooled Cohorts Equation Calculator  Breast Cancer Risk Calculator  FRAX Risk Assessment  ICSI Preventive Guidelines  Dietary Guidelines for Americans, 2010  [employer]'s MyPlate  ASA Prophylaxis  Lung CA Screening      PLAN:   reduce  Basaglar  To 45 units at bedtime to prevent low AM blood sugars except for  day before [place] visits. Then take 40 units that night due to reduced eating when at the [facility] for appts with daughter  Restart Atorvastatin 10mg tab, 1 tab daily for cholesterol   Labs today as ordered   Fasting labs in mid February 2018. See me a few days later to review results and get a pap/pelvic exam and review sugars. Check blood sugars four times a day (before meals and bedtime) for the 4 days prior to the appointment and bring these results to the appointment.  Be sure to take Humalog EVERY time you eat a meal. Do not take if not eating  Eye exam  Mammogram - [place]  FIT stool test. Inform MD when willing to undergo a colonoscopy  Flu shot      Emanuel Mcclure MD  [facility]      Injectable Influenza Immunization Documentation    1.  Is the person to be vaccinated sick today?   No    2. Does the person to be vaccinated have an allergy to a component   of the vaccine?   No  Egg Allergy Algorithm Link    3. Has the person to be vaccinated ever had a serious reaction   to influenza vaccine in the past?   No    4. Has the person to be vaccinated " ever had Guillain-Barré syndrome?   No    Form completed by Imelda Lira, [facility]

## 2018-01-16 NOTE — PATIENT INSTRUCTIONS
reduce  Basaglar  To 45 units at bedtime to prevent low AM blood sugars except for  day before [place] visits. Then take 40 units that night due to reduced eating when at the [facility] for appts with daughter  Restart Atorvastatin 10mg tab, 1 tab daily for cholesterol   Labs today as ordered   Fasting labs in mid February 2018. See me a few days later to review results and get a pap/pelvic exam and review sugars. Check blood sugars four times a day (before meals and bedtime) for the 4 days prior to the appointment and bring these results to the appointment.  Be sure to take Humalog EVERY time you eat a meal. Do not take if not eating  Eye exam  Mammogram - [place]  FIT stool test. Inform MD when willing to undergo a colonoscopy  Flu shot

## 2018-01-16 NOTE — MR AVS SNAPSHOT
After Visit Summary   1/16/2018    Devi Edwards    MRN: [id number]           Patient Information     Date Of Birth          1960        Visit Information        Provider Department      1/16/2018 2:30 PM Emanuel Mcclure MD [facility]        Today's Diagnoses     Need for prophylactic vaccination and inoculation against influenza    -  1    Type 2 diabetes mellitus without complication, with long-term current use of insulin (H)        Gastroesophageal reflux disease, esophagitis presence not specified        Hyperlipidemia with target LDL less than 100        Need for hepatitis C screening test        Encounter for screening mammogram for breast cancer          Care Instructions     Basaglar 45 units at bedtime except for  Day before [place] visits. Then take 40 units that night  Restart Atorvastatin 10mg tab, 1 tab daily for cholesterol   labs today as ordered   Fasting labs in mid February 2018. See me a few days later to review results and get a pap/pelvic exam and review sugars. Check blood sugars four times a day (before meals and bedtime) for the 4 days prior to the appointment and bring these results to the appointment.  Be sure to take Humalog EVERY time you eat a meal. Do not take if not eating  Eye exam          Follow-ups after your visit        Future tests that were ordered for you today     Open Future Orders        Priority Expected Expires Ordered    *MA Screening Digital Bilateral Routine  1/16/2019 1/16/2018            Who to contact     If you have questions or need follow up information about today's clinic visit or your schedule please contact [facility] directly at [phone].  Normal or non-critical lab and imaging results will be communicated to you by MyChart, letter or phone within 4 business days after the clinic has received the results. If you do not hear from us within 7 days, please contact the clinic through [facility] [employer]  "or phone. If you have a critical or abnormal lab result, we will notify you by phone as soon as possible.  Submit refill requests through [employer] or call your pharmacy and they will forward the refill request to us. Please allow 3 business days for your refill to be completed.          Additional Information About Your Visit        [facility] Information     [employer] lets you send messages to your doctor, view your test results, renew your prescriptions, schedule appointments and more. To sign up, go to [email or web address] . Click on \"Log in\" on the left side of the screen, which will take you to the Welcome page. Then click on \"Sign up Now\" on the right side of the page.     You will be asked to enter the access code listed below, as well as some personal information. Please follow the directions to create your username and password.     Your access code is: [email or web address]  Expires: 2018  3:11 PM     Your access code will  in 90 days. If you need help or a new code, please call your [place] clinic or [phone].        Care EveryWhere ID     This is your Care EveryWhere ID. This could be used by other organizations to access your [place] medical records  [id number]        Your Vitals Were     Pulse Temperature Height Last Period Pulse Oximetry       86 98.4  F (36.9  C) (Oral) 5' 4\" (1.626 m) 07/10/2009 98%        Blood Pressure from Last 3 Encounters:   18 134/80   17 138/84   16 124/82    Weight from Last 3 Encounters:   17 194 lb (88 kg)   16 188 lb (85.3 kg)   16 190 lb (86.2 kg)              We Performed the Following     Comprehensive metabolic panel     Fecal colorectal cancer screen FIT     FLU VAC, SPLIT VIRUS IM > 3 YO (QUADRIVALENT) [44733]     Hemoglobin A1c     Hepatitis C Screen Reflex to HCV RNA Quant and Genotype     TSH with free T4 reflex     Vaccine Administration, Initial [96638]          Today's Medication Changes          These changes are " accurate as of: 1/16/18  3:12 PM.  If you have any questions, ask your nurse or doctor.               Start taking these medicines.        Dose/Directions    blood glucose monitoring meter device kit   Commonly known as:  no brand specified   Used for:  Type 2 diabetes mellitus without complication, with long-term current use of insulin (H)   Started by:  Emanuel Mcclure MD        Use to test blood sugar 3 times daily or as directed.   Quantity:  1 kit   Refills:  0         These medicines have changed or have updated prescriptions.        Dose/Directions    BASAGLAR 100 UNIT/ML injection   This may have changed:  additional instructions   Used for:  Type 2 diabetes mellitus without complication, with long-term current use of insulin (H)   Changed by:  Emanuel Mcclure MD        45 units at bedtime   Quantity:  45 mL   Refills:  3       * blood glucose monitoring test strip   Commonly known as:  no brand specified   This may have changed:  Another medication with the same name was added. Make sure you understand how and when to take each.   Used for:  Type 2 diabetes mellitus without complication, with long-term current use of insulin (H)   Changed by:  Emanuel Mcclure MD        Per glucometer brand to match . Check sugars TID   Quantity:  300 each   Refills:  3       * blood glucose monitoring test strip   Commonly known as:  no brand specified   This may have changed:  You were already taking a medication with the same name, and this prescription was added. Make sure you understand how and when to take each.   Used for:  Type 2 diabetes mellitus without complication, with long-term current use of insulin (H)   Changed by:  Emanuel Mcclure MD        Use to test blood sugars 3 times daily or as directed (brand per insurance coverage)   Quantity:  300 strip   Refills:  3       insulin lispro 100 UNIT/ML injection   Commonly known as:  HumaLOG KWIKpen   This may have changed:  See the new instructions.   Used for:  Type 2  diabetes mellitus without complication, with long-term current use of insulin (H)   Changed by:  Emanuel Mcclure MD        INJECT 2-4 UNITS SUBCUTANEOUSLY WITH EACH MEAL.   Quantity:  15 mL   Refills:  11       * Notice:  This list has 2 medication(s) that are the same as other medications prescribed for you. Read the directions carefully, and ask your doctor or other care provider to review them with you.         Where to get your medicines      These medications were sent to [facility] [address] [place] [address] [address], [address]  [address] [place] [address], [address] [address]     Phone:  [phone]     atorvastatin 10 MG tablet    BASAGLAR 100 UNIT/ML injection    blood glucose monitoring meter device kit    blood glucose monitoring test strip    insulin lispro 100 UNIT/ML injection    insulin pen needle 31G X 8 MM    metFORMIN 1000 MG tablet    omeprazole 20 MG CR capsule                Primary Care Provider Office Phone # Fax #    Emanuel Mcclure [phone]       [address] [place] [id number]        Equal Access to Services     : Hadii aad ku hadasho Soomaali, waaxda luqadaha, qaybta kaalmada adeegyada, waxay idiin hayaan loraine kharacale crocker . So [facility] [phone].    ATENCIÓN: Si habla español, tiene a pate disposición servicios gratuitos de asistencia lingüística. Llame al [phone].    We comply with applicable federal civil rights laws and [place] laws. We do not discriminate on the basis of race, color, national origin, age, disability, sex, sexual orientation, or gender identity.            Thank you!     Thank you for choosing [facility]  for your care. Our goal is always to provide you with excellent care. Hearing back from our patients is one way we can continue to improve our services. Please take a few minutes to complete the written survey that you may receive in the mail after your visit with us. Thank you!             Your  Updated Medication List - Protect others around you: Learn how to safely use, store and throw away your medicines at www.disposemymeds.org.          This list is accurate as of: 1/16/18  3:12 PM.  Always use your most recent med list.                   Brand Name Dispense Instructions for use Diagnosis    ACE/ARB/ARNI NOT PRESCRIBED (INTENTIONAL)      ACE & ARB not prescribed due to Intolerance    Type 2 diabetes mellitus without complication (H)       aspirin 81 MG tablet          ONE DAILY        atorvastatin 10 MG tablet    LIPITOR    30 tablet    Take 1 tablet (10 mg) by mouth daily    Hyperlipidemia with target LDL less than 100       BASAGLAR 100 UNIT/ML injection     45 mL    45 units at bedtime    Type 2 diabetes mellitus without complication, with long-term current use of insulin (H)       blood glucose monitoring meter device kit    no brand specified    1 kit    Use to test blood sugar 3 times daily or as directed.    Type 2 diabetes mellitus without complication, with long-term current use of insulin (H)       * blood glucose monitoring test strip    no brand specified    300 each    Per glucometer brand to match . Check sugars TID    Type 2 diabetes mellitus without complication, with long-term current use of insulin (H)       * blood glucose monitoring test strip    no brand specified    300 strip    Use to test blood sugars 3 times daily or as directed (brand per insurance coverage)    Type 2 diabetes mellitus without complication, with long-term current use of insulin (H)       insulin lispro 100 UNIT/ML injection    HumaLOG KWIKpen    15 mL    INJECT 2-4 UNITS SUBCUTANEOUSLY WITH EACH MEAL.    Type 2 diabetes mellitus without complication, with long-term current use of insulin (H)       insulin pen needle 31G X 8 MM     400 each    4 injections per day    Type 2 diabetes mellitus without complication, with long-term current use of insulin (H)       loratadine 10 MG tablet    CLARITIN    30 tablet     Take 1 tablet (10 mg) by mouth daily    Seasonal allergic rhinitis       metFORMIN 1000 MG tablet    GLUCOPHAGE    180 tablet    Take 1 tablet (1,000 mg) by mouth 2 times daily (with meals)    Type 2 diabetes mellitus without complication, with long-term current use of insulin (H)       omeprazole 20 MG CR capsule    priLOSEC    90 capsule    Take 1 capsule (20 mg) by mouth daily    Gastroesophageal reflux disease, esophagitis presence not specified       vitamin D 2000 UNITS tablet     100 tablet    Take 2,000 Units by mouth daily.    Vitamin D deficiency       * Notice:  This list has 2 medication(s) that are the same as other medications prescribed for you. Read the directions carefully, and ask your doctor or other care provider to review them with you.

## 2018-01-17 LAB — HCV AB SERPL QL IA: NONREACTIVE

## 2018-02-19 ENCOUNTER — RADIANT APPOINTMENT (OUTPATIENT)
Dept: MAMMOGRAPHY | Facility: CLINIC | Age: 58
End: 2018-02-19
Attending: INTERNAL MEDICINE
Payer: COMMERCIAL

## 2018-02-19 DIAGNOSIS — Z12.11 SPECIAL SCREENING FOR MALIGNANT NEOPLASMS, COLON: ICD-10-CM

## 2018-02-19 DIAGNOSIS — Z12.31 VISIT FOR SCREENING MAMMOGRAM: ICD-10-CM

## 2018-02-19 DIAGNOSIS — E78.5 HYPERLIPIDEMIA WITH TARGET LDL LESS THAN 100: ICD-10-CM

## 2018-02-19 LAB
ALBUMIN SERPL-MCNC: 3.6 G/DL (ref 3.4–5)
ALP SERPL-CCNC: 116 U/L (ref 40–150)
ALT SERPL W P-5'-P-CCNC: 30 U/L (ref 0–50)
AST SERPL W P-5'-P-CCNC: 25 U/L (ref 0–45)
BILIRUB DIRECT SERPL-MCNC: 0.1 MG/DL (ref 0–0.2)
BILIRUB SERPL-MCNC: 0.5 MG/DL (ref 0.2–1.3)
CHOLEST SERPL-MCNC: 134 MG/DL
CK SERPL-CCNC: 33 U/L (ref 30–225)
HDLC SERPL-MCNC: 35 MG/DL
LDLC SERPL CALC-MCNC: 66 MG/DL
NONHDLC SERPL-MCNC: 99 MG/DL
PROT SERPL-MCNC: 7.6 G/DL (ref 6.8–8.8)
TRIGL SERPL-MCNC: 163 MG/DL

## 2018-02-19 PROCEDURE — 36415 COLL VENOUS BLD VENIPUNCTURE: CPT | Performed by: INTERNAL MEDICINE

## 2018-02-19 PROCEDURE — 82550 ASSAY OF CK (CPK): CPT | Performed by: INTERNAL MEDICINE

## 2018-02-19 PROCEDURE — 77067 SCR MAMMO BI INCL CAD: CPT | Mod: TC

## 2018-02-19 PROCEDURE — 80076 HEPATIC FUNCTION PANEL: CPT | Performed by: INTERNAL MEDICINE

## 2018-02-19 PROCEDURE — 80061 LIPID PANEL: CPT | Performed by: INTERNAL MEDICINE

## 2018-02-26 ENCOUNTER — OFFICE VISIT (OUTPATIENT)
Dept: INTERNAL MEDICINE | Facility: CLINIC | Age: 58
End: 2018-02-26
Payer: COMMERCIAL

## 2018-02-26 ENCOUNTER — RESULT FOLLOW UP (OUTPATIENT)
Dept: OBGYN | Facility: CLINIC | Age: 58
End: 2018-02-26

## 2018-02-26 VITALS
BODY MASS INDEX: 33.13 KG/M2 | HEART RATE: 88 BPM | TEMPERATURE: 98.6 F | DIASTOLIC BLOOD PRESSURE: 88 MMHG | SYSTOLIC BLOOD PRESSURE: 146 MMHG | WEIGHT: 193 LBS | RESPIRATION RATE: 19 BRPM

## 2018-02-26 DIAGNOSIS — Z79.4 TYPE 2 DIABETES MELLITUS WITHOUT COMPLICATION, WITH LONG-TERM CURRENT USE OF INSULIN (H): Primary | ICD-10-CM

## 2018-02-26 DIAGNOSIS — E11.9 TYPE 2 DIABETES MELLITUS WITHOUT COMPLICATION, WITH LONG-TERM CURRENT USE OF INSULIN (H): Primary | ICD-10-CM

## 2018-02-26 DIAGNOSIS — I10 ESSENTIAL HYPERTENSION: ICD-10-CM

## 2018-02-26 DIAGNOSIS — Z12.4 SCREENING FOR MALIGNANT NEOPLASM OF CERVIX: ICD-10-CM

## 2018-02-26 DIAGNOSIS — R87.612 PAPANICOLAOU SMEAR OF CERVIX WITH LOW GRADE SQUAMOUS INTRAEPITHELIAL LESION (LGSIL): ICD-10-CM

## 2018-02-26 PROCEDURE — 99214 OFFICE O/P EST MOD 30 MIN: CPT | Performed by: INTERNAL MEDICINE

## 2018-02-26 PROCEDURE — G0145 SCR C/V CYTO,THINLAYER,RESCR: HCPCS | Performed by: INTERNAL MEDICINE

## 2018-02-26 PROCEDURE — G0476 HPV COMBO ASSAY CA SCREEN: HCPCS | Performed by: INTERNAL MEDICINE

## 2018-02-26 PROCEDURE — G0124 SCREEN C/V THIN LAYER BY MD: HCPCS | Performed by: INTERNAL MEDICINE

## 2018-02-26 NOTE — LETTER
[facility]  [address] [address]  [phone]        February 11, 2019    [address] [facility] [address]              Dear Devi Edwards    This is to remind you that your Urinalysis is due.    You may call our office at [phone] to schedule an appointment.    Please disregard this notice if you have already had your labs drawn or made an appointment.        Sincerely,        Emanuel Mcclure MD

## 2018-02-26 NOTE — PATIENT INSTRUCTIONS
Continue same Basaglar dose  Continue Humalog 4 units  With breakfast  Increase Humalog to 6-8 units with lunch  Reduce supper/dinner Humalog to 4-5 units  Repeat nonfasting A1C and urine protein lab in early May. See me a few days later t review results and blood pressure and blood sugars. Check blood sugars four times a day (before meals and bedtime) for the 4 days prior to the appointment and bring these results to the appointment.  Pap/pelvic exam completed

## 2018-02-26 NOTE — MR AVS SNAPSHOT
"              After Visit Summary   2/26/2018    Devi Edwards    MRN: [id number]           Patient Information     Date Of Birth          1960        Visit Information        Provider Department      2/26/2018 10:00 AM Emanuel Mcclure MD [facility]        Today's Diagnoses     Screen for colon cancer        Screening for malignant neoplasm of cervix          Care Instructions    Continue same Basaglar dose  Continue Humalog 4 units  With breakfast  Increase Humalog to 6-8 units with lunch  Reduce supper/dinner Humalog to 4-5 units  Repeat nonfasting A1C and urine protein lab in early May. See me a few days later t review results and blood pressure and blood sugars. Check blood sugars four times a day (before meals and bedtime) for the 4 days prior to the appointment and bring these results to the appointment.  Pap/pelvic exam completed              Follow-ups after your visit        Who to contact     If you have questions or need follow up information about today's clinic visit or your schedule please contact [facility] directly at [phone].  Normal or non-critical lab and imaging results will be communicated to you by [facility], letter or phone within 4 business days after the clinic has received the results. If you do not hear from us within 7 days, please contact the clinic through [place] or phone. If you have a critical or abnormal lab result, we will notify you by phone as soon as possible.  Submit refill requests through [employer] or call your pharmacy and they will forward the refill request to us. Please allow 3 business days for your refill to be completed.          Additional Information About Your Visit        [facility] Information     ProviderTrust lets you send messages to your doctor, view your test results, renew your prescriptions, schedule appointments and more. To sign up, go to [email or web address] . Click on \"Log in\" on the left side of the " "screen, which will take you to the Welcome page. Then click on \"Sign up Now\" on the right side of the page.     You will be asked to enter the access code listed below, as well as some personal information. Please follow the directions to create your username and password.     Your access code is: [email or web address]  Expires: 2018  3:11 PM     Your access code will  in 90 days. If you need help or a new code, please call your [facility] clinic or [phone].        Care EveryWhere ID     This is your Care EveryWhere ID. This could be used by other organizations to access your [facility] medical records  [id number]        Your Vitals Were     Pulse Temperature Respirations Last Period BMI (Body Mass Index)       88 98.6  F (37  C) (Oral) 19 07/10/2009 33.13 kg/m2        Blood Pressure from Last 3 Encounters:   18 (!) 148/98   18 134/80   17 138/84    Weight from Last 3 Encounters:   18 193 lb (87.5 kg)   17 194 lb (88 kg)   16 188 lb (85.3 kg)              We Performed the Following     DEPRESSION ACTION PLAN (DAP)     Pap imaged thin layer screen with HPV - recommended age 30 - 65 years (select HPV order below)        Primary Care Provider Office Phone # Fax #    Emanuel Mcclure [phone]       [address] [facility] [address]        Equal Access to Services     [facility] : Hadii aad ku hadasho Soclarita, waaxda luqadaha, qaybta kaalmada jose, jodie crocker . So [facility] [phone].    ATENCIÓN: Si habla español, tiene a pate disposición servicios gratuitos de asistencia lingüística. Llame al [phone].    We comply with applicable federal civil rights laws and [place] laws. We do not discriminate on the basis of race, color, national origin, age, disability, sex, sexual orientation, or gender identity.            Thank you!     Thank you for choosing [facility]  for your care. Our goal is always to " provide you with excellent care. Hearing back from our patients is one way we can continue to improve our services. Please take a few minutes to complete the written survey that you may receive in the mail after your visit with us. Thank you!             Your Updated Medication List - Protect others around you: Learn how to safely use, store and throw away your medicines at www.disposemymeds.org.          This list is accurate as of 2/26/18 10:40 AM.  Always use your most recent med list.                   Brand Name Dispense Instructions for use Diagnosis    ACE/ARB/ARNI NOT PRESCRIBED (INTENTIONAL)      ACE & ARB not prescribed due to Intolerance    Type 2 diabetes mellitus without complication (H)       aspirin 81 MG tablet          ONE DAILY        atorvastatin 10 MG tablet    LIPITOR    30 tablet    Take 1 tablet (10 mg) by mouth daily    Hyperlipidemia with target LDL less than 100       BASAGLAR 100 UNIT/ML injection     45 mL    45 units at bedtime    Type 2 diabetes mellitus without complication, with long-term current use of insulin (H)       blood glucose monitoring meter device kit    no brand specified    1 kit    Use to test blood sugar 3 times daily or as directed.    Type 2 diabetes mellitus without complication, with long-term current use of insulin (H)       * blood glucose monitoring test strip    no brand specified    300 each    Per glucometer brand to match . Check sugars TID    Type 2 diabetes mellitus without complication, with long-term current use of insulin (H)       * blood glucose monitoring test strip    no brand specified    300 strip    Use to test blood sugars 3 times daily or as directed (brand per insurance coverage)    Type 2 diabetes mellitus without complication, with long-term current use of insulin (H)       insulin lispro 100 UNIT/ML injection    HumaLOG KWIKpen    15 mL    INJECT 2-4 UNITS SUBCUTANEOUSLY WITH EACH MEAL.    Type 2 diabetes mellitus without complication, with  long-term current use of insulin (H)       insulin pen needle 31G X 8 MM     400 each    4 injections per day    Type 2 diabetes mellitus without complication, with long-term current use of insulin (H)       loratadine 10 MG tablet    CLARITIN    30 tablet    Take 1 tablet (10 mg) by mouth daily    Seasonal allergic rhinitis       metFORMIN 1000 MG tablet    GLUCOPHAGE    180 tablet    Take 1 tablet (1,000 mg) by mouth 2 times daily (with meals)    Type 2 diabetes mellitus without complication, with long-term current use of insulin (H)       omeprazole 20 MG CR capsule    priLOSEC    90 capsule    Take 1 capsule (20 mg) by mouth daily    Gastroesophageal reflux disease, esophagitis presence not specified       vitamin D 2000 UNITS tablet     100 tablet    Take 2,000 Units by mouth daily.    Vitamin D deficiency       * Notice:  This list has 2 medication(s) that are the same as other medications prescribed for you. Read the directions carefully, and ask your doctor or other care provider to review them with you.

## 2018-02-26 NOTE — LETTER
April 27, 2018      Devi Jerry  [address]  [place] [address]    Dear ,      At [facility], your health and wellness is our primary concern. That is why we are following up on an abnormal pap from 2/26/18, which was reported as ASCUS and positive for high risk HPV. Your provider had recommended that you have a Colposcopy completed by 5/26/18. Our records do not show that this has been scheduled.    It is important to complete the follow up that your provider has suggested for you to ensure that there are no worsening changes which may, over time, develop into cancer.      Please contact our office at  [phone] to schedule an appointment for a Colposcopy at your earliest convenience. If you have questions or concerns, please call the clinic and we will be happy to assist you.    If you have completed the tests outside of [facility], please have the results forwarded to our office. We will update the chart for your primary Physician to review before your next annual physical.     Thank you for choosing [facility]!    Sincerely,      Emanuel Mcclure MD/joao

## 2018-02-26 NOTE — PROGRESS NOTES
SUBJECTIVE:   CC: Devi Edwards is an 58 year old woman who presents for  Pap and diabetes mellitus follow-up    Pt's past medical history, family history, habits, medications and allergies were reviewed with the patient today.  See snap shot for  HCM status. Most recent lab results reviewed with pt. Problem list and histories reviewed & adjusted, as indicated.  Additional history as below:    Patient had a preventative examination done 1 month ago but Pap/pelvic was declined at that time.  Now presents to have this completed.  Patient states she is not able to do colonoscopy screening at this time because of the commitment to caring for her daughter but has the FIT stool test and will mail that back in.  Mammogram recently done and normal.  Patient has increased stress caring for her daughter who is on the transplant list for a kidney and liver at the [facility].  Taking her down at least a few times a week to appointments there.  Patient no longer having any low blood sugars since Basaglar was reduced to 40 units on the night before travel to [facility] and otherwise 45 units at bedtime.  Blood sugars generally  in the morning.  Before lunch is about 130 235.  Blood sugars often and jump to approximately 250 or little higher before supper time and then improve further at bedtime to the 170-180 range.  Patient trying her best to follow a diabetic diet within the confines of occasionally just having to find what meals are available with fast food when at the [facility] with her daughter.  Denies chest pain, shortness of breath, abdominal pain.  Patient is somewhat stressed today because her daughter is not feeling well.  Blood pressure was much better last month in clinic     Additional ROS:   Constitutional, HEENT, Cardiovascular, Pulmonary, GI and , Neuro, MSK and Psych review of systems/symptoms are otherwise negative or unchanged from previous, except as noted above.      OBJECTIVE:  /88  " Pulse 88  Temp 98.6  F (37  C) (Oral)  Resp 19  Wt 193 lb (87.5 kg)  LMP 07/10/2009  BMI 33.13 kg/m2   Estimated body mass index is 33.13 kg/(m^2) as calculated from the following:    Height as of 1/16/18: 5' 4\" (1.626 m).    Weight as of this encounter: 193 lb (87.5 kg).    Pulm: Lungs clear to auscultation   CV: Regular rates and rhythm  GI: Soft, nontender, Normal active bowel sounds, No hepatosplenomegaly or masses palpable  Ext: Peripheral pulses intact. No edema.  Neuro: Normal strength and tone, sensory exam grossly normal  Pelvic: normal-appearing female external genitalia.  Pap smear obtained on the usual procedure under the witness of my nurse without complication.  Cervix appeared normal.  No cervical motion tenderness.  No discharge from the os.  Bimanual examination revealed no tenderness or palpable masses.  Uterus felt normal in size though exam is somewhat limited due to patient's obesity    Assessment/Plan: (See plan discussion below for further details)  1. Screening for malignant neoplasm of cervix  Pap complted  - Pap imaged thin layer screen with HPV - recommended age 30 - 65 years (select HPV order below)    2. Type 2 diabetes mellitus without complication, with long-term current use of insulin (H)  Uncontrolled. See plan discussion below.   - Hemoglobin A1c; Future  - Albumin Random Urine Quantitative with Creat Ratio; Future  - insulin lispro (HUMALOG KWIKPEN) 100 UNIT/ML injection; 4 units with breakfast, 6-8 units with lunch and 4-5 units with supper  Dispense: 15 mL; Refill: 11    3. Essential hypertension  Elevated but pt with stress today. Was better last month. Will do urine protein lab 3 mos and then recheck BP. If either abnormal (goal BP < 130/80). Will start ACEI    Plan discussion:  Continue same Basaglar dose  Continue Humalog 4 units  With breakfast  Increase Humalog to 6-8 units with lunch  Reduce supper/dinner Humalog to 4-5 units  Repeat nonfasting A1C and urine protein " lab in early May. See me a few days later t review results and blood pressure and blood sugars. Check blood sugars four times a day (before meals and bedtime) for the 4 days prior to the appointment and bring these results to the appointment.  Pap/pelvic exam completed           Emanuel Mcclure MD  Internal Medicine Department  [facility]

## 2018-02-26 NOTE — LETTER
August 18, 2018      Devi Ceeura  [address]  [place] [address]    Dear ,      At [facility], your health and wellness is our primary concern. That is why we are following up on an abnormal pap from 2/26/18, which was reported as ASCUS and positive for high risk HPV. Your provider had recommended that you have a Colposcopy completed by 5/26/18. Our records do not show that this has been scheduled.     It is important to complete the follow up that your provider has suggested for you to ensure that there are no worsening changes which may, over time, develop into cancer.      If you have chosen not to do the recommended colposcopy, please contact our office at [phone] to schedule an appointment for a repeat PAP smear and HPV test at your earliest convenience.    If you have completed the tests outside of [facility], please have the results forwarded to our office. We will update the chart for your primary Physician to review before your next annual physical.     Thank you for choosing [facility]!    Sincerely,      Emanuel Mcclure MD/joao

## 2018-02-28 LAB
COPATH REPORT: ABNORMAL
PAP: ABNORMAL

## 2018-03-02 LAB
FINAL DIAGNOSIS: ABNORMAL
HPV HR 12 DNA CVX QL NAA+PROBE: POSITIVE
HPV16 DNA SPEC QL NAA+PROBE: NEGATIVE
HPV18 DNA SPEC QL NAA+PROBE: NEGATIVE
SPECIMEN DESCRIPTION: ABNORMAL
SPECIMEN SOURCE CVX/VAG CYTO: ABNORMAL

## 2018-03-03 NOTE — PROGRESS NOTES
08/17/15 LSIL +HPV HR, pt. Is to schedule a colposcopy within three months, episode created,  updated, tracking has been started.  10/06/15 Only done-neg, pt. Is to have co-testing in one year.  2/26/18 ASCUS/+ HR HPV (not 16/18). Plan: colposcopy by 5/26/18  3/6/18 advised pt of result and follow up.  appt scheduled for 3/13/18  3/13/18 Appt Cancelled (rlm)  4/27/18 Only reminder letter sent (rlm)  5/29/18 3mo Only not done, updated to 6mo Only/Pap due by 8/26/18 (rlm) 05/29/18 Routed to provider (ron)  8/18/18 Only/Pap reminder letter sent (rlm)  12/14/18 Pap Follow up reminder call placed (rlm)  1/1/19 Patient is lost to follow-up. Routed to provider as BERNADETTE. (rlm)

## 2018-07-31 ENCOUNTER — TELEPHONE (OUTPATIENT)
Dept: INTERNAL MEDICINE | Facility: CLINIC | Age: 58
End: 2018-07-31

## 2018-07-31 NOTE — TELEPHONE ENCOUNTER
Panel Management Review    Patient Active Problem List   Diagnosis     Hyperlipidemia with target LDL less than 100     Vitamin D deficiency     Essential hypertension     Papanicolaou smear of cervix with low grade squamous intraepithelial lesion (LGSIL)     Non morbid obesity due to excess calories     Hypothyroidism, unspecified type     Anemia, unspecified type     Type 2 diabetes mellitus without complication, with long-term current use of insulin (H)       Patient has the following on her problem list:     Diabetes    ASA: Passed    Last A1C  Lab Results   Component Value Date    A1C 8.4 01/16/2018    A1C 8.6 03/15/2017    A1C 7.7 07/07/2016    A1C 7.1 06/26/2015    A1C 7.3 02/26/2013     A1C tested: Passed    Last LDL:    Lab Results   Component Value Date    CHOL 134 02/19/2018     Lab Results   Component Value Date    HDL 35 02/19/2018     Lab Results   Component Value Date    LDL 66 02/19/2018     Lab Results   Component Value Date    TRIG 163 02/19/2018     Lab Results   Component Value Date    CHOLHDLRATIO 4.1 06/26/2015     Lab Results   Component Value Date    NHDL 99 02/19/2018       Is the patient on a Statin? YES             Is the patient on Aspirin? YES    Medications     HMG CoA Reductase Inhibitors    atorvastatin (LIPITOR) 10 MG tablet    Salicylates    ASPIRIN 81 MG OR TABS          Last three blood pressure readings:  BP Readings from Last 3 Encounters:   02/26/18 146/88   01/16/18 134/80   03/16/17 138/84       Date of last diabetes office visit: 02/26/2018     Tobacco History:     History   Smoking Status     Never Smoker   Smokeless Tobacco     Never Used         Hypertension   Last three blood pressure readings:  BP Readings from Last 3 Encounters:   02/26/18 146/88   01/16/18 134/80   03/16/17 138/84     Blood pressure: FAILED    HTN Guidelines:  Age 18-59 BP range:  Less than 140/90  Age 60-85 with Diabetes:  Less than 140/90  Age 60-85 without Diabetes:  less than 150/90       Composite cancer screening  Chart review shows that this patient is due/due soon for the following Colonoscopy  Summary:    Patient is due/failing the following:   A1C, COLONOSCOPY, PAP and PHQ9    Action needed:   Patient needs office visit for Diabetes and HTN. and Colonoscopy    Type of outreach:    Sent letter.    Questions for provider review:    None                                                                                                                                    Imelda Lira, CARIE

## 2018-08-27 ENCOUNTER — HEALTH MAINTENANCE LETTER (OUTPATIENT)
Age: 58
End: 2018-08-27

## 2018-10-29 ENCOUNTER — HEALTH MAINTENANCE LETTER (OUTPATIENT)
Age: 58
End: 2018-10-29

## 2018-12-14 ENCOUNTER — TELEPHONE (OUTPATIENT)
Dept: OBGYN | Facility: CLINIC | Age: 58
End: 2018-12-14

## 2018-12-14 DIAGNOSIS — R87.612 PAPANICOLAOU SMEAR OF CERVIX WITH LOW GRADE SQUAMOUS INTRAEPITHELIAL LESION (LGSIL): ICD-10-CM

## 2018-12-14 NOTE — TELEPHONE ENCOUNTER
Pt is past due for Pap follow up  Reminder letter has been sent  LMTC her clinic with any questions or to schedule    Edilma Pino,   Pap Tracking

## 2019-02-18 DIAGNOSIS — Z79.4 TYPE 2 DIABETES MELLITUS WITHOUT COMPLICATION, WITH LONG-TERM CURRENT USE OF INSULIN (H): ICD-10-CM

## 2019-02-18 DIAGNOSIS — E11.9 TYPE 2 DIABETES MELLITUS WITHOUT COMPLICATION, WITH LONG-TERM CURRENT USE OF INSULIN (H): ICD-10-CM

## 2019-02-18 DIAGNOSIS — K21.9 GASTROESOPHAGEAL REFLUX DISEASE, ESOPHAGITIS PRESENCE NOT SPECIFIED: ICD-10-CM

## 2019-02-18 DIAGNOSIS — E78.5 HYPERLIPIDEMIA WITH TARGET LDL LESS THAN 100: ICD-10-CM

## 2019-02-19 NOTE — TELEPHONE ENCOUNTER
Routing refill request to provider for review/approval because:  Labs out of range:  blood pressure  Labs not current  Patient needs to be seen because it has been more than 1 year since last office visit.

## 2019-02-19 NOTE — TELEPHONE ENCOUNTER
"Requested Prescriptions   Pending Prescriptions Disp Refills     blood glucose (ACCU-CHEK SUSHIL PLUS) test strip [Pharmacy Med Name: ACCU-CHEK SUSHIL PLUS  BHARATI]  Last Written Prescription Date:  01/16/2018  Last Fill Quantity: 300,  # refills: 03   Last Office Visit: 2/26/2018   Future Office Visit:      50 strip 23     Sig: USE ONE STRIP TO CHECK GLUCOSE THREE TIMES DAILY OR  AS  DIRECTED    Diabetic Supplies Protocol Failed - 2/18/2019 12:12 PM       Failed - Recent (6 mo) or future (30 days) visit within the authorizing provider's specialty    Patient had office visit in the last 6 months or has a visit in the next 30 days with authorizing provider.  See \"Patient Info\" tab in inbasket, or \"Choose Columns\" in Meds & Orders section of the refill encounter.           Passed - Medication is active on med list       Passed - Patient is 18 years of age or older        insulin pen needle (B-D U/F) 31G X 8 MM miscellaneous [Pharmacy Med Name: B-D UF III SHORT PEN NEED MIS]  Last Written Prescription Date:  01/16/2018  Last Fill Quantity: 400,  # refills: 03   Last Office Visit: 2/26/2018   Future Office Visit:      100 each 15     Sig: USE FOUR NEEDLES DAILY    Diabetic Supplies Protocol Failed - 2/18/2019 12:12 PM       Failed - Recent (6 mo) or future (30 days) visit within the authorizing provider's specialty    Patient had office visit in the last 6 months or has a visit in the next 30 days with authorizing provider.  See \"Patient Info\" tab in inbasket, or \"Choose Columns\" in Meds & Orders section of the refill encounter.           Passed - Medication is active on med list       Passed - Patient is 18 years of age or older        metFORMIN (GLUCOPHAGE) 1000 MG tablet [Pharmacy Med Name: METFORMIN 1000MG TAB]  Last Written Prescription Date:  01/16/2018  Last Fill Quantity: 180,  # refills: 03   Last Office Visit: 2/26/2018   Future Office Visit:      60 tablet 11     Sig: TAKE ONE TABLET BY MOUTH TWICE DAILY WITH " "MEALS    Biguanide Agents Failed - 2/18/2019 12:12 PM       Failed - Blood pressure less than 140/90 in past 6 months    BP Readings from Last 3 Encounters:   02/26/18 146/88   01/16/18 134/80   03/16/17 138/84                Failed - Patient has had a Microalbumin in the past 15 mos.    Recent Labs   Lab Test 07/07/16  1125   MICROL 29   UMALCR 11.51            Failed - Patient has documented A1c within the specified period of time.    If HgbA1C is 8 or greater, it needs to be on file within the past 3 months.  If less than 8, must be on file within the past 6 months.     Recent Labs   Lab Test 01/16/18  1539   A1C 8.4*            Failed - Recent (6 mo) or future (30 days) visit within the authorizing provider's specialty    Patient had office visit in the last 6 months or has a visit in the next 30 days with authorizing provider or within the authorizing provider's specialty.  See \"Patient Info\" tab in inbasket, or \"Choose Columns\" in Meds & Orders section of the refill encounter.           Passed - Patient has documented LDL within the past 12 mos.    Recent Labs   Lab Test 02/19/18  1001   LDL 66            Passed - Patient is age 10 or older       Passed - Patient's CR is NOT>1.4 OR Patient's EGFR is NOT<45 within past 12 mos.    Recent Labs   Lab Test 01/16/18  1539   GFRESTIMATED 71   GFRESTBLACK 86       Recent Labs   Lab Test 01/16/18  1539   CR 0.83            Passed - Patient does NOT have a diagnosis of CHF.       Passed - Medication is active on med list       Passed - Patient is not pregnant       Passed - Patient has not had a positive pregnancy test within the past 12 mos.         insulin glargine (BASAGLAR KWIKPEN) 100 UNIT/ML pen [Pharmacy Med Name: BASAGLAR 100UNIT    INJ]  Last Written Prescription Date:  01/16/2018  Last Fill Quantity: 45mL,  # refills: 03   Last Office Visit: 2/26/2018   Future Office Visit:       11     Sig: INJECT 45 UNITS AT BEDTIME    Long Acting Insulin Protocol Failed - " "2/18/2019 12:12 PM       Failed - Blood pressure less than 140/90 in past 6 months    BP Readings from Last 3 Encounters:   02/26/18 146/88   01/16/18 134/80   03/16/17 138/84                Failed - Microalbumin on file in past 12 months    Recent Labs   Lab Test 07/07/16  1125   MICROL 29   UMALCR 11.51            Failed - Serum creatinine on file in past 12 months    Recent Labs   Lab Test 01/16/18  1539   CR 0.83            Failed - HgbA1C in past 3 or 6 months    If HgbA1C is 8 or greater, it needs to be on file within the past 3 months.  If less than 8, must be on file within the past 6 months.     Recent Labs   Lab Test 01/16/18  1539   A1C 8.4*            Failed - Recent (6 mo) or future (30 days) visit within the authorizing provider's specialty    Patient had office visit in the last 6 months or has a visit in the next 30 days with authorizing provider or within the authorizing provider's specialty.  See \"Patient Info\" tab in inbasket, or \"Choose Columns\" in Meds & Orders section of the refill encounter.           Passed - LDL on file in past 12 months    Recent Labs   Lab Test 02/19/18  1001   LDL 66            Passed - Medication is active on med list       Passed - Patient is age 18 or older        atorvastatin (LIPITOR) 10 MG tablet [Pharmacy Med Name: ATORVASTATIN 10MG   TAB]  Last Written Prescription Date:  01/16/2018  Last Fill Quantity: 30,  # refills: 11   Last Office Visit: 2/26/2018   Future Office Visit:      30 tablet 11     Sig: TAKE ONE TABLET BY MOUTH ONCE DAILY    Statins Protocol Passed - 2/18/2019 12:12 PM       Passed - LDL on file in past 12 months    Recent Labs   Lab Test 02/19/18  1001   LDL 66            Passed - No abnormal creatine kinase in past 12 months    Recent Labs   Lab Test 02/19/18  1001   CKT 33               Passed - Recent (12 mo) or future (30 days) visit within the authorizing provider's specialty    Patient had office visit in the last 12 months or has a visit in " "the next 30 days with authorizing provider or within the authorizing provider's specialty.  See \"Patient Info\" tab in [employer], or \"Choose Columns\" in Meds & Orders section of the refill encounter.             Passed - Medication is active on med list       Passed - Patient is age 18 or older       Passed - No active pregnancy on record       Passed - No positive pregnancy test in past 12 months        omeprazole (PRILOSEC) 20 MG DR capsule [Pharmacy Med Name: OMEPRAZOLE 20MG CAP]  Last Written Prescription Date:  01/16/2018  Last Fill Quantity: 90,  # refills: 03   Last Office Visit: 2/26/2018   Future Office Visit:      30 capsule 11     Sig: TAKE ONE CAPSULE BY MOUTH ONCE DAILY    PPI Protocol Passed - 2/18/2019 12:12 PM       Passed - Not on Clopidogrel (unless Pantoprazole ordered)       Passed - No diagnosis of osteoporosis on record       Passed - Recent (12 mo) or future (30 days) visit within the authorizing provider's specialty    Patient had office visit in the last 12 months or has a visit in the next 30 days with authorizing provider or within the authorizing provider's specialty.  See \"Patient Info\" tab in [employer], or \"Choose Columns\" in Meds & Orders section of the refill encounter.             Passed - Medication is active on med list       Passed - Patient is age 18 or older       Passed - No active pregnacy on record       Passed - No positive pregnancy test in past 12 months          "

## 2019-02-20 RX ORDER — ATORVASTATIN CALCIUM 10 MG/1
TABLET, FILM COATED ORAL
Qty: 30 TABLET | Refills: 0 | Status: SHIPPED | OUTPATIENT
Start: 2019-02-20 | End: 2019-04-16

## 2019-02-20 RX ORDER — INSULIN GLARGINE 100 [IU]/ML
INJECTION, SOLUTION SUBCUTANEOUS
Qty: 15 ML | Refills: 0 | Status: SHIPPED | OUTPATIENT
Start: 2019-02-20 | End: 2019-04-18

## 2019-02-20 NOTE — TELEPHONE ENCOUNTER
Patient last seen in clinic 1 year ago.  At that time, she was instructed to have follow-up labs 3 months later admit to see me in clinic.  No appointments made.  Medication refills approved for 30 days.  Please call patient to schedule a fasting lab appointment in the next week and patient to then see me back in clinic within the next 30 days to review lab results and blood sugars. Check blood sugars three a day (before breakfast, supper and bedtime) for 4 days prior to the appointment with me and bring the results to the appointment.

## 2019-04-16 DIAGNOSIS — E78.5 HYPERLIPIDEMIA WITH TARGET LDL LESS THAN 100: ICD-10-CM

## 2019-04-16 DIAGNOSIS — Z79.4 TYPE 2 DIABETES MELLITUS WITHOUT COMPLICATION, WITH LONG-TERM CURRENT USE OF INSULIN (H): ICD-10-CM

## 2019-04-16 DIAGNOSIS — E11.9 TYPE 2 DIABETES MELLITUS WITHOUT COMPLICATION, WITH LONG-TERM CURRENT USE OF INSULIN (H): ICD-10-CM

## 2019-04-16 DIAGNOSIS — K21.9 GASTROESOPHAGEAL REFLUX DISEASE, ESOPHAGITIS PRESENCE NOT SPECIFIED: ICD-10-CM

## 2019-04-16 RX ORDER — ATORVASTATIN CALCIUM 10 MG/1
10 TABLET, FILM COATED ORAL DAILY
Qty: 30 TABLET | Refills: 0 | Status: SHIPPED | OUTPATIENT
Start: 2019-04-16 | End: 2019-04-25

## 2019-04-16 NOTE — TELEPHONE ENCOUNTER
"Medication is being filled for 1 time refill only due to:  pt has upcoming appt on 4/25/19     Requested Prescriptions   Pending Prescriptions Disp Refills     omeprazole (PRILOSEC) 20 MG DR capsule 30 capsule 0       PPI Protocol Passed - 4/16/2019 11:49 AM        Passed - Not on Clopidogrel (unless Pantoprazole ordered)        Passed - No diagnosis of osteoporosis on record        Passed - Recent (12 mo) or future (30 days) visit within the authorizing provider's specialty     Patient had office visit in the last 12 months or has a visit in the next 30 days with authorizing provider or within the authorizing provider's specialty.  See \"Patient Info\" tab in inbasket, or \"Choose Columns\" in Meds & Orders section of the refill encounter.              Passed - Medication is active on med list        Passed - Patient is age 18 or older        Passed - No active pregnacy on record        Passed - No positive pregnancy test in past 12 months        atorvastatin (LIPITOR) 10 MG tablet 30 tablet 0     Sig: Take 1 tablet (10 mg) by mouth daily       Statins Protocol Failed - 4/16/2019 11:49 AM        Failed - LDL on file in past 12 months     Recent Labs   Lab Test 02/19/18  1001   LDL 66             Passed - No abnormal creatine kinase in past 12 months     Recent Labs   Lab Test 02/19/18  1001   CKT 33                Passed - Recent (12 mo) or future (30 days) visit within the authorizing provider's specialty     Patient had office visit in the last 12 months or has a visit in the next 30 days with authorizing provider or within the authorizing provider's specialty.  See \"Patient Info\" tab in inbasket, or \"Choose Columns\" in Meds & Orders section of the refill encounter.              Passed - Medication is active on med list        Passed - Patient is age 18 or older        Passed - No active pregnancy on record        Passed - No positive pregnancy test in past 12 months        metFORMIN (GLUCOPHAGE) 1000 MG tablet 60 " "tablet 0       Biguanide Agents Failed - 4/16/2019 11:49 AM        Failed - Blood pressure less than 140/90 in past 6 months     BP Readings from Last 3 Encounters:   02/26/18 146/88   01/16/18 134/80   03/16/17 138/84                 Failed - Patient has documented LDL within the past 12 mos.     Recent Labs   Lab Test 02/19/18  1001   LDL 66             Failed - Patient has had a Microalbumin in the past 15 mos.     Recent Labs   Lab Test 07/07/16  1125   MICROL 29   UMALCR 11.51             Failed - Patient has documented A1c within the specified period of time.     If HgbA1C is 8 or greater, it needs to be on file within the past 3 months.  If less than 8, must be on file within the past 6 months.     Recent Labs   Lab Test 01/16/18  1539   A1C 8.4*             Failed - Patient's CR is NOT>1.4 OR Patient's EGFR is NOT<45 within past 12 mos.     Recent Labs   Lab Test 01/16/18  1539   GFRESTIMATED 71   GFRESTBLACK 86       Recent Labs   Lab Test 01/16/18  1539   CR 0.83             Passed - Patient is age 10 or older        Passed - Patient does NOT have a diagnosis of CHF.        Passed - Medication is active on med list        Passed - Patient is not pregnant        Passed - Patient has not had a positive pregnancy test within the past 12 mos.         Passed - Recent (6 mo) or future (30 days) visit within the authorizing provider's specialty     Patient had office visit in the last 6 months or has a visit in the next 30 days with authorizing provider or within the authorizing provider's specialty.  See \"Patient Info\" tab in inbasket, or \"Choose Columns\" in Meds & Orders section of the refill encounter.                "

## 2019-04-18 DIAGNOSIS — E11.9 TYPE 2 DIABETES MELLITUS WITHOUT COMPLICATION, WITH LONG-TERM CURRENT USE OF INSULIN (H): ICD-10-CM

## 2019-04-18 DIAGNOSIS — Z79.4 TYPE 2 DIABETES MELLITUS WITHOUT COMPLICATION, WITH LONG-TERM CURRENT USE OF INSULIN (H): ICD-10-CM

## 2019-04-18 RX ORDER — INSULIN GLARGINE 100 [IU]/ML
INJECTION, SOLUTION SUBCUTANEOUS
Qty: 15 ML | Refills: 0 | Status: SHIPPED | OUTPATIENT
Start: 2019-04-18 | End: 2019-04-25

## 2019-04-18 NOTE — TELEPHONE ENCOUNTER
Requested Prescriptions   Pending Prescriptions Disp Refills     insulin glargine (BASAGLAR KWIKPEN) 100 UNIT/ML pen [Pharmacy Med Name: BASAGLAR 100UNIT    INJ]  0     Sig: INJECT 45 UNITS UNDER THE SKIN EVERY NIGHT AT BEDTIME       Long Acting Insulin Protocol Failed - 4/18/2019  1:31 PM        Failed - Blood pressure less than 140/90 in past 6 months     BP Readings from Last 3 Encounters:   02/26/18 146/88   01/16/18 134/80   03/16/17 138/84     Last Written Prescription Date:  2/20/2019  Last Fill Quantity: 15,  # refills: 0   Last office visit: 2/26/2018 with prescribing provider:  Emanuel Mcclure     Future Office Visit:   Next 5 appointments (look out 90 days)    Apr 25, 2019  8:30 AM CDT  Office Visit with Emanuel Mcclure MD  [facility] ([facility]) [address] [address] [phone]       Routing refill request to provider for review/approval because:  Labs not current.   Patient has not been seen in over 1 year.   Chacorta given x1 and patient did not follow up, please advise-   upcoming office, ok with additional chacorta?          Failed - LDL on file in past 12 months     Recent Labs   Lab Test 02/19/18  1001   LDL 66             Failed - Microalbumin on file in past 12 months     Recent Labs   Lab Test 07/07/16  1125   MICROL 29   UMALCR 11.51             Failed - Serum creatinine on file in past 12 months     Recent Labs   Lab Test 01/16/18  1539   CR 0.83             Failed - HgbA1C in past 3 or 6 months     If HgbA1C is 8 or greater, it needs to be on file within the past 3 months.  If less than 8, must be on file within the past 6 months.     Recent Labs   Lab Test 01/16/18  1539   A1C 8.4*             Passed - Medication is active on med list        Passed - Patient is age 18 or older        Passed - Recent (6 mo) or future (30 days) visit within the authorizing provider's specialty     Patient had office visit in the last 6 months  "or has a visit in the next 30 days with authorizing provider or within the authorizing provider's specialty.  See \"Patient Info\" tab in inbasket, or \"Choose Columns\" in Meds & Orders section of the refill encounter.            Tamika RIZO RN, BSN, PHN      "

## 2019-04-25 ENCOUNTER — OFFICE VISIT (OUTPATIENT)
Dept: INTERNAL MEDICINE | Facility: CLINIC | Age: 59
End: 2019-04-25
Payer: COMMERCIAL

## 2019-04-25 DIAGNOSIS — F32.1 CURRENT MODERATE EPISODE OF MAJOR DEPRESSIVE DISORDER WITHOUT PRIOR EPISODE (H): ICD-10-CM

## 2019-04-25 DIAGNOSIS — E78.5 HYPERLIPIDEMIA WITH TARGET LDL LESS THAN 100: ICD-10-CM

## 2019-04-25 DIAGNOSIS — I10 HYPERTENSION, UNSPECIFIED TYPE: ICD-10-CM

## 2019-04-25 DIAGNOSIS — Z79.4 TYPE 2 DIABETES MELLITUS WITHOUT COMPLICATION, WITH LONG-TERM CURRENT USE OF INSULIN (H): ICD-10-CM

## 2019-04-25 DIAGNOSIS — E11.9 TYPE 2 DIABETES MELLITUS WITHOUT COMPLICATION, WITH LONG-TERM CURRENT USE OF INSULIN (H): ICD-10-CM

## 2019-04-25 DIAGNOSIS — R87.610 ATYPICAL SQUAMOUS CELLS OF UNDETERMINED SIGNIFICANCE (ASCUS) ON PAPANICOLAOU SMEAR OF CERVIX: ICD-10-CM

## 2019-04-25 DIAGNOSIS — K21.9 GASTROESOPHAGEAL REFLUX DISEASE, ESOPHAGITIS PRESENCE NOT SPECIFIED: ICD-10-CM

## 2019-04-25 LAB
ALBUMIN SERPL-MCNC: 3.5 G/DL (ref 3.4–5)
ALP SERPL-CCNC: 98 U/L (ref 40–150)
ALT SERPL W P-5'-P-CCNC: 15 U/L (ref 0–50)
ANION GAP SERPL CALCULATED.3IONS-SCNC: 6 MMOL/L (ref 3–14)
AST SERPL W P-5'-P-CCNC: 12 U/L (ref 0–45)
BILIRUB SERPL-MCNC: 0.3 MG/DL (ref 0.2–1.3)
BUN SERPL-MCNC: 13 MG/DL (ref 7–30)
CALCIUM SERPL-MCNC: 9.1 MG/DL (ref 8.5–10.1)
CHLORIDE SERPL-SCNC: 106 MMOL/L (ref 94–109)
CHOLEST SERPL-MCNC: 140 MG/DL
CO2 SERPL-SCNC: 27 MMOL/L (ref 20–32)
CREAT SERPL-MCNC: 0.87 MG/DL (ref 0.52–1.04)
CREAT UR-MCNC: 104 MG/DL
GFR SERPL CREATININE-BSD FRML MDRD: 72 ML/MIN/{1.73_M2}
GLUCOSE SERPL-MCNC: 156 MG/DL (ref 70–99)
HBA1C MFR BLD: 7.9 % (ref 0–5.6)
HDLC SERPL-MCNC: 37 MG/DL
LDLC SERPL CALC-MCNC: 66 MG/DL
MICROALBUMIN UR-MCNC: 61 MG/L
MICROALBUMIN/CREAT UR: 58.85 MG/G CR (ref 0–25)
NONHDLC SERPL-MCNC: 103 MG/DL
POTASSIUM SERPL-SCNC: 4.2 MMOL/L (ref 3.4–5.3)
PROT SERPL-MCNC: 7.7 G/DL (ref 6.8–8.8)
SODIUM SERPL-SCNC: 139 MMOL/L (ref 133–144)
T4 FREE SERPL-MCNC: 0.94 NG/DL (ref 0.76–1.46)
TRIGL SERPL-MCNC: 187 MG/DL
TSH SERPL DL<=0.005 MIU/L-ACNC: 5.08 MU/L (ref 0.4–4)

## 2019-04-25 PROCEDURE — 80061 LIPID PANEL: CPT | Performed by: INTERNAL MEDICINE

## 2019-04-25 PROCEDURE — 36415 COLL VENOUS BLD VENIPUNCTURE: CPT | Performed by: INTERNAL MEDICINE

## 2019-04-25 PROCEDURE — 84439 ASSAY OF FREE THYROXINE: CPT | Performed by: INTERNAL MEDICINE

## 2019-04-25 PROCEDURE — 82043 UR ALBUMIN QUANTITATIVE: CPT | Performed by: INTERNAL MEDICINE

## 2019-04-25 PROCEDURE — 99214 OFFICE O/P EST MOD 30 MIN: CPT | Performed by: INTERNAL MEDICINE

## 2019-04-25 PROCEDURE — 83036 HEMOGLOBIN GLYCOSYLATED A1C: CPT | Performed by: INTERNAL MEDICINE

## 2019-04-25 PROCEDURE — 84443 ASSAY THYROID STIM HORMONE: CPT | Performed by: INTERNAL MEDICINE

## 2019-04-25 PROCEDURE — 80053 COMPREHEN METABOLIC PANEL: CPT | Performed by: INTERNAL MEDICINE

## 2019-04-25 RX ORDER — LOSARTAN POTASSIUM 50 MG/1
50 TABLET ORAL DAILY
Qty: 30 TABLET | Refills: 11 | Status: SHIPPED | OUTPATIENT
Start: 2019-04-25 | End: 2020-06-22

## 2019-04-25 RX ORDER — ESCITALOPRAM OXALATE 10 MG/1
10 TABLET ORAL DAILY
Qty: 30 TABLET | Refills: 11 | Status: SHIPPED | OUTPATIENT
Start: 2019-04-25 | End: 2019-07-16

## 2019-04-25 RX ORDER — INSULIN GLARGINE 100 [IU]/ML
INJECTION, SOLUTION SUBCUTANEOUS
Qty: 15 ML | Refills: 1 | Status: SHIPPED | OUTPATIENT
Start: 2019-04-25 | End: 2019-07-08

## 2019-04-25 RX ORDER — ATORVASTATIN CALCIUM 10 MG/1
10 TABLET, FILM COATED ORAL DAILY
Qty: 30 TABLET | Refills: 5 | Status: SHIPPED | OUTPATIENT
Start: 2019-04-25 | End: 2019-12-16

## 2019-04-25 NOTE — PATIENT INSTRUCTIONS
Labs as ordered   Escitalopram 10mg tab, 1/2 tab daily for 4 days.  Then 1 tab daily in  AM with food for depression  Losartan 50mg tab, 1 tab daily for blood pressure   Stop metformin for now   Appt with Gynecology dept  At [facility] for a colposcopy evaluation of the cervix  See me back in clinic in 2-3 weeks for follow-up  Referral  to [facility] counseling re:: grieving. They will call to schedule or you may call   Continue Omeprazole for stomach

## 2019-04-25 NOTE — TELEPHONE ENCOUNTER
"New pharmacy, requests new Rx's--no refills, not transferrable    Requested Prescriptions   Pending Prescriptions Disp Refills     atorvastatin (LIPITOR) 10 MG tablet 30 tablet 0     Sig: Take 1 tablet (10 mg) by mouth daily       Statins Protocol Passed - 4/25/2019  4:18 PM        Passed - LDL on file in past 12 months     Recent Labs   Lab Test 04/25/19  0954   LDL 66             Passed - No abnormal creatine kinase in past 12 months     Recent Labs   Lab Test 02/19/18  1001   CKT 33                Passed - Recent (12 mo) or future (30 days) visit within the authorizing provider's specialty     Patient had office visit in the last 12 months or has a visit in the next 30 days with authorizing provider or within the authorizing provider's specialty.  See \"Patient Info\" tab in inbasket, or \"Choose Columns\" in Meds & Orders section of the refill encounter.              Passed - Medication is active on med list        Passed - Patient is age 18 or older        Passed - No active pregnancy on record        Passed - No positive pregnancy test in past 12 months        metFORMIN (GLUCOPHAGE) 1000 MG tablet 60 tablet 0     Sig: TAKE ONE TABLET BY MOUTH TWICE DAILY WITH MEALS       Biguanide Agents Failed - 4/25/2019  4:18 PM        Failed - Blood pressure less than 140/90 in past 6 months     BP Readings from Last 3 Encounters:   02/26/18 146/88   01/16/18 134/80   03/16/17 138/84                 Passed - Patient has documented LDL within the past 12 mos.     Recent Labs   Lab Test 04/25/19  0954   LDL 66             Passed - Patient has had a Microalbumin in the past 15 mos.     Recent Labs   Lab Test 04/25/19  1010   MICROL 61   UMALCR 58.85*             Passed - Patient is age 10 or older        Passed - Patient has documented A1c within the specified period of time.     If HgbA1C is 8 or greater, it needs to be on file within the past 3 months.  If less than 8, must be on file within the past 6 months.     Recent Labs " "  Lab Test 04/25/19  0954   A1C 7.9*             Passed - Patient's CR is NOT>1.4 OR Patient's EGFR is NOT<45 within past 12 mos.     Recent Labs   Lab Test 04/25/19  0954   GFRESTIMATED 72   GFRESTBLACK 84       Recent Labs   Lab Test 04/25/19  0954   CR 0.87             Passed - Patient does NOT have a diagnosis of CHF.        Passed - Medication is active on med list        Passed - Patient is not pregnant        Passed - Patient has not had a positive pregnancy test within the past 12 mos.         Passed - Recent (6 mo) or future (30 days) visit within the authorizing provider's specialty     Patient had office visit in the last 6 months or has a visit in the next 30 days with authorizing provider or within the authorizing provider's specialty.  See \"Patient Info\" tab in inbasket, or \"Choose Columns\" in Meds & Orders section of the refill encounter.            omeprazole (PRILOSEC) 20 MG DR capsule 30 capsule 0     Sig: TAKE ONE CAPSULE BY MOUTH ONCE DAILY       PPI Protocol Passed - 4/25/2019  4:18 PM        Passed - Not on Clopidogrel (unless Pantoprazole ordered)        Passed - No diagnosis of osteoporosis on record        Passed - Recent (12 mo) or future (30 days) visit within the authorizing provider's specialty     Patient had office visit in the last 12 months or has a visit in the next 30 days with authorizing provider or within the authorizing provider's specialty.  See \"Patient Info\" tab in inbasket, or \"Choose Columns\" in Meds & Orders section of the refill encounter.              Passed - Medication is active on med list        Passed - Patient is age 18 or older        Passed - No active pregnacy on record        Passed - No positive pregnancy test in past 12 months        insulin glargine (BASAGLAR KWIKPEN) 100 UNIT/ML pen 15 mL 0     Sig: INJECT 45 UNITS UNDER THE SKIN EVERY NIGHT AT BEDTIME       Long Acting Insulin Protocol Failed - 4/25/2019  4:18 PM        Failed - Blood pressure less than " "140/90 in past 6 months     BP Readings from Last 3 Encounters:   02/26/18 146/88   01/16/18 134/80   03/16/17 138/84                 Passed - LDL on file in past 12 months     Recent Labs   Lab Test 04/25/19  0954   LDL 66             Passed - Microalbumin on file in past 12 months     Recent Labs   Lab Test 04/25/19  1010   MICROL 61   UMALCR 58.85*             Passed - Serum creatinine on file in past 12 months     Recent Labs   Lab Test 04/25/19  0954   CR 0.87             Passed - HgbA1C in past 3 or 6 months     If HgbA1C is 8 or greater, it needs to be on file within the past 3 months.  If less than 8, must be on file within the past 6 months.     Recent Labs   Lab Test 04/25/19  0954   A1C 7.9*             Passed - Medication is active on med list        Passed - Patient is age 18 or older        Passed - Recent (6 mo) or future (30 days) visit within the authorizing provider's specialty     Patient had office visit in the last 6 months or has a visit in the next 30 days with authorizing provider or within the authorizing provider's specialty.  See \"Patient Info\" tab in inbasket, or \"Choose Columns\" in Meds & Orders section of the refill encounter.          atorvastatin (LIPITOR) 10 MG tablet  Last Written Prescription Date:  4/16/19  Last Fill Quantity: 30,  # refills: 0   Last office visit: No previous visit found with prescribing provider:  4/25/19   Future Office Visit:   Next 5 appointments (look out 90 days)    May 09, 2019  8:30 AM CDT  Office Visit with Emanuel Mcclure MD  [facility] ([facility]) [address] [address] [phone]           metFORMIN (GLUCOPHAGE) 1000 MG tablet  Last Written Prescription Date:  4/16/19  Last Fill Quantity: 60,  # refills: 0   Last office visit: No previous visit found with prescribing provider:  4/25/19   Future Office Visit:   Next 5 appointments (look out 90 days)    May 09, 2019  8:30 AM " CDT  Office Visit with Emanuel Mcclure MD  [facility] ([facility]) [address] [address]  [phone]           omeprazole (PRILOSEC) 20 MG DR capsule  Last Written Prescription Date:  4/16/19  Last Fill Quantity: 30,  # refills: 0   Last office visit: No previous visit found with prescribing provider:  4/25/19   Future Office Visit:   Next 5 appointments (look out 90 days)    May 09, 2019  8:30 AM CDT  Office Visit with Emanuel Mcclure MD  [facility] ([facility]) [address] [address]  [phone]           insulin glargine (BASAGLAR KWIKPEN) 100 UNIT/ML pen  Last Written Prescription Date:  4/18/19  Last Fill Quantity: 15ml,  # refills: 0   Last office visit: No previous visit found with prescribing provider:  4/25/19   Future Office Visit:   Next 5 appointments (look out 90 days)    May 09, 2019  8:30 AM CDT  Office Visit with Emanuel Mcclure MD  [facility] ([facility]) [address] [place] [id number] [phone]

## 2019-04-25 NOTE — PROGRESS NOTES
SUBJECTIVE:   Devi Edwards is a 59 year old female who presents to clinic today for the following   health issues as below.  Pt arrived 15 min late for appt and due to emotional issues at time of appt and need for MD to address this along with multiple other issues, full vitals were not obtained by nurse at appt    Diabetes Follow-up    Patient is checking blood sugars: 5 times daily.    Blood sugar testing frequency justification: Uncontrolled diabetes  Results are as follows:         am - 100's         lunchtime - 200's         suppertime - 160's to 200's          bedtime - 200's         200's    Diabetic concerns: blood sugar frequently over 200     Symptoms of hypoglycemia (low blood sugar): none     Paresthesias (numbness or burning in feet) or sores: No     Date of last diabetic eye exam: No Recent Eye Exam on File    Diabetes Management Resources    Hyperlipidemia Follow-Up      Rate your low fat/cholesterol diet?: not monitoring fat    Taking statin?  Yes, no muscle aches from statin    Other lipid medications/supplements?:  none    Hypertension Follow-up      Outpatient blood pressures are being checked at home.  Results are a little on the higher end.    Low Salt Diet: low salt    BP Readings from Last 2 Encounters:   02/26/18 146/88   01/16/18 134/80     Hemoglobin A1C (%)   Date Value   01/16/2018 8.4 (H)   03/15/2017 8.6 (H)     LDL Cholesterol Calculated (mg/dL)   Date Value   02/19/2018 66   03/15/2017 130 (H)     Hypothyroidism Follow-up      Since last visit, patient describes the following symptoms: Weight stable, no hair loss, no skin changes, no constipation, no loose stools      Amount of exercise or physical activity: None    Problems taking medications regularly: No    Medication side effects: none    Diet: low salt      Pt's past medical history, family history, habits, medications and allergies were reviewed with the patient today.  See snap shot for  HCM status. Most recent lab results  reviewed with pt. Problem list and histories reviewed & adjusted, as indicated.  Additional history as below:      Component      Latest Ref Rng & Units 1/16/2018 2/19/2018   Sodium      133 - 144 mmol/L 140    Potassium      3.4 - 5.3 mmol/L 4.0    Chloride      94 - 109 mmol/L 105    Carbon Dioxide      20 - 32 mmol/L 26    Anion Gap      3 - 14 mmol/L 9    Glucose      70 - 99 mg/dL 143 (H)    Urea Nitrogen      7 - 30 mg/dL 10    Creatinine      0.52 - 1.04 mg/dL 0.83    GFR Estimate      >60 mL/min/1.7m2 71    GFR Estimate If Black      >60 mL/min/1.7m2 86    Calcium      8.5 - 10.1 mg/dL 9.1    Bilirubin Total      0.2 - 1.3 mg/dL 0.3 0.5   Albumin      3.4 - 5.0 g/dL 3.8 3.6   Protein Total      6.8 - 8.8 g/dL 7.9 7.6   Alkaline Phosphatase      40 - 150 U/L 109 116   ALT      0 - 50 U/L 37 30   AST      0 - 45 U/L 31 25   Bilirubin Direct      0.0 - 0.2 mg/dL  0.1   Cholesterol      <200 mg/dL  134   Triglycerides      <150 mg/dL  163 (H)   HDL Cholesterol      >49 mg/dL  35 (L)   LDL Cholesterol Calculated      <100 mg/dL  66   Non HDL Cholesterol      <130 mg/dL  99   PAP           Copath Report           Hemoglobin A1C      4.3 - 6.0 % 8.4 (H)    TSH      0.40 - 4.00 mU/L 3.96    CK Total      30 - 225 U/L  33     Component      Latest Ref Rng & Units 2/26/2018   Sodium      133 - 144 mmol/L    Potassium      3.4 - 5.3 mmol/L    Chloride      94 - 109 mmol/L    Carbon Dioxide      20 - 32 mmol/L    Anion Gap      3 - 14 mmol/L    Glucose      70 - 99 mg/dL    Urea Nitrogen      7 - 30 mg/dL    Creatinine      0.52 - 1.04 mg/dL    GFR Estimate      >60 mL/min/1.7m2    GFR Estimate If Black      >60 mL/min/1.7m2    Calcium      8.5 - 10.1 mg/dL    Bilirubin Total      0.2 - 1.3 mg/dL    Albumin      3.4 - 5.0 g/dL    Protein Total      6.8 - 8.8 g/dL    Alkaline Phosphatase      40 - 150 U/L    ALT      0 - 50 U/L    AST      0 - 45 U/L    Bilirubin Direct      0.0 - 0.2 mg/dL    Cholesterol      <200 mg/dL  "   Triglycerides      <150 mg/dL    HDL Cholesterol      >49 mg/dL    LDL Cholesterol Calculated      <100 mg/dL    Non HDL Cholesterol      <130 mg/dL    PAP       ASC-US (A)   Copath Report       Patient Name: RUDY BALTAZAR . . .   Hemoglobin A1C      4.3 - 6.0 %    TSH      0.40 - 4.00 mU/L    CK Total      30 - 225 U/L      This patient's sample is positive for other HR HPV DNA (types 31, 33, 35, 39, 45, 51, 52,   56, 58, 59, 66 or 68), not HPV 16 or HPV 18 DNA. This result requires clinical correlation    with concurrent cytology findings.     Patient not seen since February 2018.  Blood sugars not controlled at that time per A1c above.  Blood sugars still remain elevated.  Diet has not been consistent recently due to intermittent nausea related to depression and anxiety.  Patient describes this similar to when she had been pregnant and food \"stank\". Patient's daughter who had multiple health problems including previous kidney/liver transplant passed away in August.  Patient continues still grieving about this.  She is quite tearful today talking about it.  Patient is accompanied by her son who helps her with translation.  Patient declines offer to use telephone  service today.  No actual vomiting.  Patient denies blood in her stools.  No chest pain or shortness of breath.  History of abnormal Pap smear.  Was HPV positive but not for type  16 and 18. Gynecology had recommended a colposcopy but patient never had this done.  Denies vaginal bleeding  Patient also has stress because of financial issues.  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  She considered taking on a different client as a PCA but found it too emotionally difficult to be doing because of reminders regarding her daughter.  Bowel movements occasionally constipating and occasionally loose.  Denies any blood in her stools.  Has noticed some increased acid reflux " "recently         Additional ROS:   Constitutional, HEENT, Cardiovascular, Pulmonary, GI and , Neuro, MSK and Psych review of systems/symptoms are otherwise negative or unchanged from previous, except as noted above.      OBJECTIVE:  /70   Pulse 84   LMP 07/10/2009    Estimated body mass index is 33.13 kg/m  as calculated from the following:    Height as of 1/16/18: 1.626 m (5' 4\").    Weight as of 2/26/18: 87.5 kg (193 lb).     Neck: no adenopathy. Thyroid normal to palpation. No bruits  Pulm: Lungs clear to auscultation   CV: Regular rates and rhythm  GI: Soft, mildly obese, nontender, Normal active bowel sounds, No hepatosplenomegaly or masses palpable  Ext: Peripheral pulses intact. No edema.  Neuro: Normal strength and tone, sensory exam grossly normal  Gen: Tearful often through the appt      PHQ-9 ([employer]) 4/25/2019   1.  Little interest or pleasure in doing things 2   2.  Feeling down, depressed, or hopeless 3   3.  Trouble falling or staying asleep, or sleeping too much 1   4.  Feeling tired or having little energy 1   5.  Poor appetite or overeating 3   6.  Feeling bad about yourself 1   7.  Trouble concentrating 1   8.  Moving slowly or restless 0   9.  Suicidal or self-harm thoughts 0   PHQ-9 Total Score 12   Difficulty at work, home, or with people Very difficult     Assessment/Plan: (See plan discussion below for further details)    1. Type 2 diabetes mellitus without complication, with long-term current use of insulin (H)  Uncontrolled.  Blood sugars as above.  Diet variable because of depression issues.  If some nausea due to metformin in addition to ?  Depression.  Will get updated labs.  Patient to hold metformin for now.  We will start treating depression and follow-up with the patient in a couple weeks.  We will then make adjustments to insulin dosages based on blood sugars at that time when diet improved  - TSH with free T4 reflex  - Albumin Random Urine Quantitative with Creat Ratio  - " Lipid panel reflex to direct LDL Fasting  - Comprehensive metabolic panel  - Hemoglobin A1c    2. Hyperlipidemia with target LDL less than 100  Continue atorvastatin for now.  Lab is ordered  - Lipid panel reflex to direct LDL Fasting  - Comprehensive metabolic panel    3. Current moderate episode of major depressive disorder without prior episode (H)  Uncontrolled.  Will start Lexapro.  Patient needs to see a therapist in addition to assist with grieving process  - escitalopram (LEXAPRO) 10 MG tablet; Take 1 tablet (10 mg) by mouth daily  Dispense: 30 tablet; Refill: 11  - MENTAL HEALTH REFERRAL  - Adult; Outpatient Treatment; Individual/Couples/Family/Group Therapy/Health Psychology; G: [facility] [phone]; We will contact you to schedule the appointment or please call with any questions      4. Hypertension, unspecified type  Uncontrolled.  Start losartan.  Recheck blood pressure in a few weeks  - losartan (COZAAR) 50 MG tablet; Take 1 tablet (50 mg) by mouth daily  Dispense: 30 tablet; Refill: 11    5. Atypical squamous cells of undetermined significance (ASCUS) on Papanicolaou smear of cervix  Negative for HPV 16 or 18 but positive for other HPV types. Gynecology had recommended colposcopy.  Patient to schedule appointment with gynecology.       Plan discussion:   Labs as ordered   Escitalopram 10mg tab, 1/2 tab daily for 4 days.  Then 1 tab daily in  AM with food for depression  Losartan 50mg tab, 1 tab daily for blood pressure   Stop metformin for now   Appt with Gynecology dept  At [facility] for a colposcopy evaluation of the cervix  See me back in clinic in 2-3 weeks for follow-up  Referral  to [facility] counseling re:: grieving. They will call to schedule or you may call   Continue Omeprazole for stomach    Emanuel Mcclure MD  Internal Medicine Department  [facility]

## 2019-04-25 NOTE — TELEPHONE ENCOUNTER
Basaglar transferred to [facility] pharmacy.     Prescription approved per [facility] Refill Protocol.    Tamika RIZO RN, BSN, PHN

## 2019-04-28 VITALS — SYSTOLIC BLOOD PRESSURE: 170 MMHG | HEART RATE: 84 BPM | DIASTOLIC BLOOD PRESSURE: 70 MMHG

## 2019-04-28 ASSESSMENT — PATIENT HEALTH QUESTIONNAIRE - PHQ9: SUM OF ALL RESPONSES TO PHQ QUESTIONS 1-9: 12

## 2019-05-13 DIAGNOSIS — E11.9 TYPE 2 DIABETES MELLITUS WITHOUT COMPLICATION, WITH LONG-TERM CURRENT USE OF INSULIN (H): ICD-10-CM

## 2019-05-13 DIAGNOSIS — Z79.4 TYPE 2 DIABETES MELLITUS WITHOUT COMPLICATION, WITH LONG-TERM CURRENT USE OF INSULIN (H): ICD-10-CM

## 2019-05-13 NOTE — TELEPHONE ENCOUNTER
"Requested Prescriptions   Pending Prescriptions Disp Refills     metFORMIN (GLUCOPHAGE) 1000 MG tablet 60 tablet 0     Sig: TAKE ONE TABLET BY MOUTH TWICE DAILY WITH MEALS       Biguanide Agents Failed - 5/13/2019 11:15 AM        Failed - Blood pressure less than 140/90 in past 6 months     BP Readings from Last 3 Encounters:   04/25/19 170/70   02/26/18 146/88   01/16/18 134/80                 Failed - Medication is active on med list        Passed - Patient has documented LDL within the past 12 mos.     Recent Labs   Lab Test 04/25/19  0954   LDL 66             Passed - Patient has had a Microalbumin in the past 15 mos.     Recent Labs   Lab Test 04/25/19  1010   MICROL 61   UMALCR 58.85*             Passed - Patient is age 10 or older        Passed - Patient has documented A1c within the specified period of time.     If HgbA1C is 8 or greater, it needs to be on file within the past 3 months.  If less than 8, must be on file within the past 6 months.     Recent Labs   Lab Test 04/25/19  0954   A1C 7.9*             Passed - Patient's CR is NOT>1.4 OR Patient's EGFR is NOT<45 within past 12 mos.     Recent Labs   Lab Test 04/25/19  0954   GFRESTIMATED 72   GFRESTBLACK 84       Recent Labs   Lab Test 04/25/19  0954   CR 0.87             Passed - Patient does NOT have a diagnosis of CHF.        Passed - Patient is not pregnant        Passed - Patient has not had a positive pregnancy test within the past 12 mos.         Passed - Recent (6 mo) or future (30 days) visit within the authorizing provider's specialty     Patient had office visit in the last 6 months or has a visit in the next 30 days with authorizing provider or within the authorizing provider's specialty.  See \"Patient Info\" tab in inbasket, or \"Choose Columns\" in Meds & Orders section of the refill encounter.            "

## 2019-05-17 ENCOUNTER — OFFICE VISIT (OUTPATIENT)
Dept: INTERNAL MEDICINE | Facility: CLINIC | Age: 59
End: 2019-05-17
Payer: COMMERCIAL

## 2019-05-17 DIAGNOSIS — E11.9 TYPE 2 DIABETES MELLITUS WITHOUT COMPLICATION, WITH LONG-TERM CURRENT USE OF INSULIN (H): ICD-10-CM

## 2019-05-17 DIAGNOSIS — B35.4 TINEA CORPORIS: ICD-10-CM

## 2019-05-17 DIAGNOSIS — Z79.4 TYPE 2 DIABETES MELLITUS WITHOUT COMPLICATION, WITH LONG-TERM CURRENT USE OF INSULIN (H): ICD-10-CM

## 2019-05-17 DIAGNOSIS — E03.9 HYPOTHYROIDISM, UNSPECIFIED TYPE: ICD-10-CM

## 2019-05-17 DIAGNOSIS — F32.0 MILD MAJOR DEPRESSION (H): ICD-10-CM

## 2019-05-17 DIAGNOSIS — B97.7 HPV (HUMAN PAPILLOMA VIRUS) INFECTION: ICD-10-CM

## 2019-05-17 PROCEDURE — 99214 OFFICE O/P EST MOD 30 MIN: CPT | Performed by: INTERNAL MEDICINE

## 2019-05-17 RX ORDER — CLOTRIMAZOLE 1 %
CREAM (GRAM) TOPICAL 2 TIMES DAILY
Qty: 30 G | Refills: 1 | Status: SHIPPED | OUTPATIENT
Start: 2019-05-17 | End: 2023-10-16

## 2019-05-17 ASSESSMENT — MIFFLIN-ST. JEOR: SCORE: 1383.28

## 2019-05-17 NOTE — PATIENT INSTRUCTIONS
restart Metformin twice a day   Try to schedule meal times to be more regular  If not very hungry, then take 1/2 of the usual Humalog dose  Continue other medications   Repeat nonfasting A1C and urine protein lab in 3 months (mid August). See me a few days later. Check blood sugars three a day (before breakfast, supper and bedtime) for 4 days prior to the appointment with me and bring the results to the appointment.  Clotrimazole antifungal cream twice a day under breasts where rash present   GYN appt

## 2019-05-19 VITALS
HEIGHT: 63 IN | WEIGHT: 185 LBS | BODY MASS INDEX: 32.78 KG/M2 | OXYGEN SATURATION: 96 % | TEMPERATURE: 98.3 F | SYSTOLIC BLOOD PRESSURE: 142 MMHG | HEART RATE: 76 BPM | RESPIRATION RATE: 16 BRPM | DIASTOLIC BLOOD PRESSURE: 76 MMHG

## 2019-05-19 PROBLEM — D64.9 ANEMIA, UNSPECIFIED TYPE: Status: RESOLVED | Noted: 2017-01-14 | Resolved: 2019-05-19

## 2019-06-05 DIAGNOSIS — Z79.4 TYPE 2 DIABETES MELLITUS WITHOUT COMPLICATION, WITH LONG-TERM CURRENT USE OF INSULIN (H): Primary | ICD-10-CM

## 2019-06-05 DIAGNOSIS — E11.9 TYPE 2 DIABETES MELLITUS WITHOUT COMPLICATION, WITH LONG-TERM CURRENT USE OF INSULIN (H): Primary | ICD-10-CM

## 2019-06-07 NOTE — TELEPHONE ENCOUNTER
Please send rx for Onetouch Delica Lancets and Onetouch Verio Meter.  These are covered by Insurance.

## 2019-06-10 RX ORDER — BLOOD-GLUCOSE METER
EACH MISCELLANEOUS
Qty: 1 KIT | Refills: 0 | Status: SHIPPED | OUTPATIENT
Start: 2019-06-10 | End: 2021-09-03

## 2019-07-08 DIAGNOSIS — Z79.4 TYPE 2 DIABETES MELLITUS WITHOUT COMPLICATION, WITH LONG-TERM CURRENT USE OF INSULIN (H): ICD-10-CM

## 2019-07-08 DIAGNOSIS — E11.9 TYPE 2 DIABETES MELLITUS WITHOUT COMPLICATION, WITH LONG-TERM CURRENT USE OF INSULIN (H): ICD-10-CM

## 2019-07-08 RX ORDER — INSULIN GLARGINE 100 [IU]/ML
INJECTION, SOLUTION SUBCUTANEOUS
Qty: 15 ML | Refills: 1 | Status: SHIPPED | OUTPATIENT
Start: 2019-07-08 | End: 2019-07-16

## 2019-07-08 NOTE — TELEPHONE ENCOUNTER
"Requested Prescriptions   Pending Prescriptions Disp Refills     insulin glargine (BASAGLAR KWIKPEN) 100 UNIT/ML pen [Pharmacy Med Name: BASAGLAR KWIKPEN 100UNIT/ML SOPN]  Last Written Prescription Date:  04/25/2019  Last Fill Quantity: 15mL,  # refills: 01   Last Office Visit: 5/17/2019   Future Office Visit:      15 mL 1     Sig: INJECT 45 UNITS UNDER THE SKIN AT BEDTIME       Long Acting Insulin Protocol Failed - 7/8/2019  6:13 PM        Failed - Blood pressure less than 140/90 in past 6 months     BP Readings from Last 3 Encounters:   05/17/19 142/76   04/25/19 170/70   02/26/18 146/88                 Passed - LDL on file in past 12 months     Recent Labs   Lab Test 04/25/19  0954   LDL 66             Passed - Microalbumin on file in past 12 months     Recent Labs   Lab Test 04/25/19  1010   MICROL 61   UMALCR 58.85*             Passed - Serum creatinine on file in past 12 months     Recent Labs   Lab Test 04/25/19  0954   CR 0.87             Passed - HgbA1C in past 3 or 6 months     If HgbA1C is 8 or greater, it needs to be on file within the past 3 months.  If less than 8, must be on file within the past 6 months.     Recent Labs   Lab Test 04/25/19  0954   A1C 7.9*             Passed - Medication is active on med list        Passed - Patient is age 18 or older        Passed - Recent (6 mo) or future (30 days) visit within the authorizing provider's specialty     Patient had office visit in the last 6 months or has a visit in the next 30 days with authorizing provider or within the authorizing provider's specialty.  See \"Patient Info\" tab in inbasket, or \"Choose Columns\" in Meds & Orders section of the refill encounter.              "

## 2019-07-16 ENCOUNTER — TELEPHONE (OUTPATIENT)
Dept: INTERNAL MEDICINE | Facility: CLINIC | Age: 59
End: 2019-07-16

## 2019-07-16 DIAGNOSIS — E11.9 TYPE 2 DIABETES MELLITUS WITHOUT COMPLICATION, WITH LONG-TERM CURRENT USE OF INSULIN (H): ICD-10-CM

## 2019-07-16 DIAGNOSIS — Z79.4 TYPE 2 DIABETES MELLITUS WITHOUT COMPLICATION, WITH LONG-TERM CURRENT USE OF INSULIN (H): ICD-10-CM

## 2019-07-16 DIAGNOSIS — F32.1 CURRENT MODERATE EPISODE OF MAJOR DEPRESSIVE DISORDER WITHOUT PRIOR EPISODE (H): ICD-10-CM

## 2019-07-16 DIAGNOSIS — E11.9 TYPE 2 DIABETES MELLITUS WITHOUT COMPLICATION (H): ICD-10-CM

## 2019-07-16 RX ORDER — INSULIN GLARGINE 100 [IU]/ML
INJECTION, SOLUTION SUBCUTANEOUS
Qty: 15 ML | Refills: 11 | Status: SHIPPED | OUTPATIENT
Start: 2019-07-16 | End: 2019-07-18

## 2019-07-16 RX ORDER — ESCITALOPRAM OXALATE 10 MG/1
10 TABLET ORAL DAILY
Qty: 30 TABLET | Refills: 2 | Status: SHIPPED | OUTPATIENT
Start: 2019-07-16 | End: 2020-07-06

## 2019-07-16 NOTE — TELEPHONE ENCOUNTER
Prior Authorization Retail Medication Request    Medication/Dose:insulin glargine (BASAGLAR KWIKPEN) 100 UNIT/ML pen  ICD code (if different than what is on RX):  7/16/2019  Previously Tried and Failed:  15 mL  Rationale:  11    Insurance Name:  Nati MAXWELL  Insurance ID:  [id number]      Pharmacy Information (if different than what is on RX)  Name:  [facility]  Phone:  [phone]

## 2019-07-16 NOTE — TELEPHONE ENCOUNTER
Due for PHQ  Again in mid September after improved mood when last seen in late April after Lexapro started early April. RF done Lexapro for 2 mos.  Insulin refilled  Due for repeat nonfasting labs in late  August for diabetes. Call pt and remind her and assist with scheduling future lab appt and then close encounter

## 2019-07-16 NOTE — TELEPHONE ENCOUNTER
PHQ-9 SCORE 6/26/2015 1/16/2018 4/25/2019   PHQ-9 Total Score 0 - -   PHQ-9 Total Score - 0 12     Routing refill request to provider for review/approval because:   out of range:  blood pressure  phq9 >4

## 2019-07-16 NOTE — LETTER
July 17, 2019      Devi Patel  [address]  [place] [place] [address]          Dear Devi,       While refilling your prescription for Lexapro and Insulin today, we noticed that you are due to have labs drawn in late August for diabetes.  We will refill your prescription for 60 days, but a follow-up appointment must be made before any additional refills can be approved.     Also, please complete the following questionnaire mid September and mail back to us.    Taking care of your health is important to us and we look forward to seeing you in the near future.  Please call us at [phone] or [phone] (or use [employer]) to schedule an appointment.     Please disregard this notice if you have already made an appointment.    Sincerely,        [facility]

## 2019-07-16 NOTE — TELEPHONE ENCOUNTER
Requested Prescriptions   Pending Prescriptions Disp Refills     insulin glargine (BASAGLAR KWIKPEN) 100 UNIT/ML pen 15 mL 1     Sig: INJECT 45 UNITS UNDER THE SKIN AT BEDTIME       Long Acting Insulin Protocol Failed - 7/16/2019  9:38 AM        Failed - Blood pressure less than 140/90 in past 6 months     BP Readings from Last 3 Encounters:   05/17/19 142/76   04/25/19 170/70   02/26/18 146/88                 Passed - LDL on file in past 12 months     Recent Labs   Lab Test 04/25/19  0954   LDL 66             Passed - Microalbumin on file in past 12 months     Recent Labs   Lab Test 04/25/19  1010   MICROL 61   UMALCR 58.85*             Passed - Serum creatinine on file in past 12 months     Recent Labs   Lab Test 04/25/19  0954   CR 0.87             Passed - HgbA1C in past 3 or 6 months     If HgbA1C is 8 or greater, it needs to be on file within the past 3 months.  If less than 8, must be on file within the past 6 months.     Recent Labs   Lab Test 04/25/19  0954   A1C 7.9*     Requested Prescriptions   Pending Prescriptions Disp Refills     insulin glargine (BASAGLAR KWIKPEN) 100 UNIT/ML pen 15 mL 1     Sig: INJECT 45 UNITS UNDER THE SKIN AT BEDTIME       Long Acting Insulin Protocol Failed - 7/16/2019  9:38 AM        Failed - Blood pressure less than 140/90 in past 6 months     BP Readings from Last 3 Encounters:   05/17/19 142/76   04/25/19 170/70   02/26/18 146/88                 Passed - LDL on file in past 12 months     Recent Labs   Lab Test 04/25/19  0954   LDL 66             Passed - Microalbumin on file in past 12 months     Recent Labs   Lab Test 04/25/19  1010   MICROL 61   UMALCR 58.85*             Passed - Serum creatinine on file in past 12 months     Recent Labs   Lab Test 04/25/19  0954   CR 0.87             Passed - HgbA1C in past 3 or 6 months     If HgbA1C is 8 or greater, it needs to be on file within the past 3 months.  If less than 8, must be on file within the past 6 months.     Recent  "Labs   Lab Test 04/25/19  0954   A1C 7.9*             Passed - Medication is active on med list        Passed - Patient is age 18 or older        Passed - Recent (6 mo) or future (30 days) visit within the authorizing provider's specialty     Patient had office visit in the last 6 months or has a visit in the next 30 days with authorizing provider or within the authorizing provider's specialty.  See \"Patient Info\" tab in inbasket, or \"Choose Columns\" in Meds & Orders section of the refill encounter.            escitalopram (LEXAPRO) 10 MG tablet 30 tablet 11     Sig: Take 1 tablet (10 mg) by mouth daily       SSRIs Protocol Failed - 7/16/2019  9:38 AM        Failed - PHQ-9 score less than 5 in past 6 months     Please review last PHQ-9 score.           Passed - Medication is active on med list        Passed - Patient is age 18 or older        Passed - No active pregnancy on record        Passed - No positive pregnancy test in last 12 months        Passed - Recent (6 mo) or future (30 days) visit within the authorizing provider's specialty     Patient had office visit in the last 6 months or has a visit in the next 30 days with authorizing provider or within the authorizing provider's specialty.  See \"Patient Info\" tab in inbasket, or \"Choose Columns\" in Meds & Orders section of the refill encounter.            Last Written Prescription Date:  4/25/19  Last Fill Quantity: 30,  # refills: 11   Last office visit: 5/17/2019 with prescribing provider:  5/17/19   Future Office Visit:            Passed - Medication is active on med list        Passed - Patient is age 18 or older        Passed - Recent (6 mo) or future (30 days) visit within the authorizing provider's specialty     Patient had office visit in the last 6 months or has a visit in the next 30 days with authorizing provider or within the authorizing provider's specialty.  See \"Patient Info\" tab in inbasket, or \"Choose Columns\" in Meds & Orders section of the " "refill encounter.            escitalopram (LEXAPRO) 10 MG tablet 30 tablet 11     Sig: Take 1 tablet (10 mg) by mouth daily       SSRIs Protocol Failed - 7/16/2019  9:38 AM        Failed - PHQ-9 score less than 5 in past 6 months     Please review last PHQ-9 score.           Passed - Medication is active on med list        Passed - Patient is age 18 or older        Passed - No active pregnancy on record        Passed - No positive pregnancy test in last 12 months        Passed - Recent (6 mo) or future (30 days) visit within the authorizing provider's specialty     Patient had office visit in the last 6 months or has a visit in the next 30 days with authorizing provider or within the authorizing provider's specialty.  See \"Patient Info\" tab in inbasket, or \"Choose Columns\" in Meds & Orders section of the refill encounter.            Last Written Prescription Date:  7/8/19  Last Fill Quantity: 15,  # refills: 1   Last office visit: 5/17/2019 with prescribing provider:  5/17/19   Future Office Visit:      "

## 2019-07-18 NOTE — TELEPHONE ENCOUNTER
Called patient using  Services.Pt unavailable at this time.  Left message for patient to return call. Gave clinic number.FABIANA Scott, CMA

## 2019-07-18 NOTE — TELEPHONE ENCOUNTER
Contact patient and inform her that Basaglar is no longer covered by insurance.  Patient to start Lantus insulin instead once a day with same dosing instruction as the Basaglar. Rx faxed to pharmacy.  Patient to have nonfasting labs in mid August as previously ordered and then follow-up with me a few days later. Check blood sugars three a day (before breakfast, supper and bedtime) for 4 days prior to the appointment with me and bring the results to the appointment.

## 2019-07-23 NOTE — TELEPHONE ENCOUNTER
CTC on file, left detailed message regarding: Rx change schedule appointment for Lab and Follow up appointment.

## 2019-07-30 NOTE — PROGRESS NOTES
SUBJECTIVE:   Devi Patel is a 59 year old female who presents to clinic today for the following   health issues:    Diabetes Follow-up    Patient is checking blood sugars: three times daily.   Blood sugar testing frequency justification: Provider  Results are as follows:         am - 130's to 140's         lunchtime - 200's         bedtime - 200's    Diabetic concerns: blood sugar frequently over 200     Symptoms of hypoglycemia (low blood sugar): none     Paresthesias (numbness or burning in feet) or sores: No     Date of last diabetic eye exam: No Eye Exam on File    Diabetes Management Resources    Hyperlipidemia Follow-Up      Rate your low fat/cholesterol diet?: not monitoring fat    Taking statin?  Yes, no muscle aches from statin    Other lipid medications/supplements?:  none    Hypertension Follow-up      Outpatient blood pressures are being checked at home.  Results are 170's/90's.    Low Salt Diet: not monitoring salt    BP Readings from Last 2 Encounters:   04/25/19 170/70   02/26/18 146/88     Hemoglobin A1C (%)   Date Value   04/25/2019 7.9 (H)   01/16/2018 8.4 (H)     LDL Cholesterol Calculated (mg/dL)   Date Value   04/25/2019 66   02/19/2018 66     Hypothyroidism Follow-up      Since last visit, patient describes the following symptoms: Weight stable, no hair loss, no skin changes, no constipation, no loose stools      Amount of exercise or physical activity: None    Problems taking medications regularly: No    Medication side effects: none    Diet: regular (no restrictions)    Pt's past medical history, family history, habits, medications and allergies were reviewed with the patient today.  See snap shot for  HCM status. Most recent lab results reviewed with pt. Problem list and histories reviewed & adjusted, as indicated.  Additional history as below:    Lab Results   Component Value Date     04/25/2019     Lab Results   Component Value Date    A1C 7.9 04/25/2019     Lab Results  What Type Of Note Output Would You Prefer (Optional)?: Bullet Format "  Component Value Date    CHOL 140 04/25/2019     Lab Results   Component Value Date    LDL 66 04/25/2019     Lab Results   Component Value Date    HDL 37 04/25/2019     Lab Results   Component Value Date    TRIG 187 04/25/2019     Lab Results   Component Value Date    CR 0.87 04/25/2019     Lab Results   Component Value Date    ALT 15 04/25/2019     Lab Results   Component Value Date    AST 12 04/25/2019     Lab Results   Component Value Date    MICROL 61 04/25/2019     Lab Results   Component Value Date    TSH 5.08 04/25/2019     Blood sugars elevated since stopping metformin.   The medication had been stopped to see if contributing to GI upset but symptoms persisted.  Later with starting SSRI and improved mental health status, abdominal symptoms have improved.  Patient denies chest pain, shortness of breath, current abdominal pain.   Appetite occasionally reduced during periods of the day.  Will get a low blood sugar if taking regular dose Humalog with less food intake with that meal.  Has not made a follow-up appointment with gynecology yet regarding need for colposcopy  Has some rash underneath both breasts  Mood overall better since Starting Lexapro except for Mother's day weekend when had some increased sadness missing her daughter         Additional ROS:   Constitutional, HEENT, Cardiovascular, Pulmonary, GI and , Neuro, MSK and Psych review of systems/symptoms are otherwise negative or unchanged from previous, except as noted above.      OBJECTIVE:  /76   Pulse 76   Temp 98.3  F (36.8  C) (Oral)   Resp 16   Ht 1.6 m (5' 3\")   Wt 83.9 kg (185 lb)   LMP 07/10/2009   SpO2 96%   BMI 32.77 kg/m     Estimated body mass index is 32.77 kg/m  as calculated from the following:    Height as of this encounter: 1.6 m (5' 3\").    Weight as of this encounter: 83.9 kg (185 lb).     Neck: no adenopathy. Thyroid normal to palpation. No bruits  Pulm: Lungs clear to auscultation   CV: Regular rates and " Hpi Title: Evaluation of Skin Lesions Additional History: Patient last seen 10/25/2016 by MAYDA rhythm  GI: Soft, mildly obese, nontender, Normal active bowel sounds, No hepatosplenomegaly or masses palpable  Ext: Peripheral pulses intact. No edema.  Neuro: Normal strength and tone, sensory exam grossly normal  Gen: Improved affect  Skin: Mild erythema under bilateral breasts c/w tinea    Assessment/Plan: (See plan discussion below for further details)  1. Type 2 diabetes mellitus without complication, with long-term current use of insulin (H)  Needs improved control.  Patient to restart metformin as previous.  Continue current insulin therapy except will lower Humalog dosage if eating less for a particular meal.  Recheck A1c, urine protein 3 months  - Hemoglobin A1c; Future  - Albumin Random Urine Quantitative with Creat Ratio; Future    2. Mild major depression (H)  Improved with use of Lexapro.  Will repeat PHQ in 4 mos    3. Hypothyroidism, unspecified type  TSH mildly elevated.  Clinically euthyroid.  Recheck lab 3 months  - TSH with free T4 reflex; Future    4. Tinea corporis  Rash consistent with fungal etiology, especially with diabetic history and mild obesity.  Will treat with Lotrimin  - clotrimazole (LOTRIMIN) 1 % external cream; Apply topically 2 times daily Until rash resolved  Dispense: 30 g; Refill: 1    5. HPV (human papilloma virus) infection  History of HPV infection though not type XVI/XVIII.  Pap smear normal.  Gynecology recommended colposcopy.  Patient to schedule appointment    Plan discussion:  Restart Metformin twice a day   Try to schedule meal times to be more regular  If not very hungry, then take 1/2 of the usual Humalog dose  Continue other medications   Repeat nonfasting A1C, TSH and urine protein lab in 3 months (mid August). See me a few days later. Check blood sugars three a day (before breakfast, supper and bedtime) for 4 days prior to the appointment with me and bring the results to the appointment.  Clotrimazole antifungal cream twice a day under breasts where rash present    GYN appt      Emanuel Mcclure MD  Internal Medicine Department  [facility]    (Chart documentation was completed, in part, with [employer] voice-recognition software. Even though reviewed, some grammatical, spelling, and word errors may remain.)

## 2019-08-12 ENCOUNTER — TELEPHONE (OUTPATIENT)
Dept: INTERNAL MEDICINE | Facility: CLINIC | Age: 59
End: 2019-08-12

## 2019-08-12 NOTE — TELEPHONE ENCOUNTER
Panel Management Review    Patient Active Problem List   Diagnosis     Hyperlipidemia with target LDL less than 100     Mild major depression (H)     Vitamin D deficiency     Essential hypertension     Papanicolaou smear of cervix with low grade squamous intraepithelial lesion (LGSIL)     Non morbid obesity due to excess calories     Hypothyroidism, unspecified type     Type 2 diabetes mellitus without complication, with long-term current use of insulin (H)       Patient has the following on her problem list:     Depression / Dysthymia review    Measure:  Needs PHQ-9 score of 4 or less during index window.  Administer PHQ-9 and if score is 5 or more, send encounter to provider for next steps.    5 - 7 month window range: 12    PHQ-9 SCORE 6/26/2015 1/16/2018 4/25/2019   PHQ-9 Total Score 0 - -   PHQ-9 Total Score - 0 12       If PHQ-9 recheck is 5 or more, route to provider for next steps.    Patient is due for:  None    Diabetes    ASA: Passed    Last A1C  Lab Results   Component Value Date    A1C 7.9 04/25/2019    A1C 8.4 01/16/2018    A1C 8.6 03/15/2017    A1C 7.7 07/07/2016    A1C 7.1 06/26/2015     A1C tested: Passed    Last LDL:    Lab Results   Component Value Date    CHOL 140 04/25/2019     Lab Results   Component Value Date    HDL 37 04/25/2019     Lab Results   Component Value Date    LDL 66 04/25/2019     Lab Results   Component Value Date    TRIG 187 04/25/2019     Lab Results   Component Value Date    CHOLHDLRATIO 4.1 06/26/2015     Lab Results   Component Value Date    NHDL 103 04/25/2019       Is the patient on a Statin? YES             Is the patient on Aspirin? YES    Medications     HMG CoA Reductase Inhibitors     atorvastatin (LIPITOR) 10 MG tablet       Salicylates     ASPIRIN 81 MG OR TABS             Last three blood pressure readings:  BP Readings from Last 3 Encounters:   05/17/19 142/76   04/25/19 170/70   02/26/18 146/88       Date of last diabetes office visit: 05/17/2019     Tobacco  History:     History   Smoking Status     Never Smoker   Smokeless Tobacco     Never Used         Hypertension   Last three blood pressure readings:  BP Readings from Last 3 Encounters:   05/17/19 142/76   04/25/19 170/70   02/26/18 146/88     Blood pressure: FAILED    HTN Guidelines:  Less than 140/90      Composite cancer screening  Chart review shows that this patient is due/due soon for the following Colonoscopy  Summary:    Patient is due/failing the following:   Eye Exam, COLONOSCOPY and PAP    Action needed:   Patient needs office visit for Preventive Exam.    Type of outreach:    Phone, left message for patient to call back.     Questions for provider review:    None                                                                                                                                    Imelda Lira CMA       Chart routed to Imelda Lira CMA   .

## 2019-08-12 NOTE — LETTER
[facility]  [address] [address]  [phone]  August 19, 2019    Devi COHEN NO  [facility] [address]    Dear Devi,    We care about your health and based on a review of your medical records, recommend the the following, to better manage your health:      You are in particular need of attention regarding:  -Wellness (Physical) Visit     I am recommending that you:     -schedule a WELLNESS (Physical) APPOINTMENT with .    Please call us at [phone] or [phone] (or use [employer]) to address the above recommendations.     Thank you for trusting [facility].  We appreciate the opportunity to serve you and look forward to supporting your healthcare needs in the future.    If you have (or plan to have) any of these tests done at a facility other than a [facility] or a [facility], please have the results from these tests sent to your primary physician at [facility].    Healthy Regards,    Emanuel Mcclure MD/Imelda Lira, [facility]

## 2019-11-04 DIAGNOSIS — E11.9 TYPE 2 DIABETES MELLITUS WITHOUT COMPLICATION, WITH LONG-TERM CURRENT USE OF INSULIN (H): ICD-10-CM

## 2019-11-04 DIAGNOSIS — Z79.4 TYPE 2 DIABETES MELLITUS WITHOUT COMPLICATION, WITH LONG-TERM CURRENT USE OF INSULIN (H): ICD-10-CM

## 2019-11-05 NOTE — TELEPHONE ENCOUNTER
"Requested Prescriptions   Pending Prescriptions Disp Refills     blood glucose (ACCU-CHEK SUSHIL PLUS) test strip [Pharmacy Med Name: ACCU-CHEK SUSHIL PLUS  STRP] 100 each 0     Sig: USE TO TEST BLOOD SUGAR THREE TIMES A DAY       Diabetic Supplies Protocol Passed - 11/4/2019  2:08 PM        Passed - Medication is active on med list        Passed - Patient is 18 years of age or older        Passed - Recent (6 mo) or future (30 days) visit within the authorizing provider's specialty     Patient had office visit in the last 6 months or has a visit in the next 30 days with authorizing provider.  See \"Patient Info\" tab in inbasket, or \"Choose Columns\" in Meds & Orders section of the refill encounter.              Prescription approved per [facility] Refill Protocol.    Tamika RIZO RN, BSN, PHN      "

## 2019-12-16 DIAGNOSIS — E78.5 HYPERLIPIDEMIA WITH TARGET LDL LESS THAN 100: ICD-10-CM

## 2019-12-16 RX ORDER — ATORVASTATIN CALCIUM 10 MG/1
TABLET, FILM COATED ORAL
Qty: 30 TABLET | Refills: 5 | Status: SHIPPED | OUTPATIENT
Start: 2019-12-16 | End: 2020-06-22

## 2019-12-16 NOTE — TELEPHONE ENCOUNTER
"    Requested Prescriptions   Pending Prescriptions Disp Refills     atorvastatin (LIPITOR) 10 MG tablet [Pharmacy Med Name: ATORVASTATIN CALCIUM 10MG TABS] 30 tablet 5     Sig: TAKE ONE TABLET BY MOUTH ONCE DAILY.       Statins Protocol Passed - 12/16/2019  3:06 PM        Passed - LDL on file in past 12 months     Recent Labs   Lab Test 04/25/19  0954   LDL 66             Passed - No abnormal creatine kinase in past 12 months     Recent Labs   Lab Test 02/19/18  1001   CKT 33                Passed - Recent (12 mo) or future (30 days) visit within the authorizing provider's specialty     Patient has had an office visit with the authorizing provider or a provider within the authorizing providers department within the previous 12 mos or has a future within next 30 days. See \"Patient Info\" tab in inbasket, or \"Choose Columns\" in Meds & Orders section of the refill encounter.              Passed - Medication is active on med list        Passed - Patient is age 18 or older        Passed - No active pregnancy on record        Passed - No positive pregnancy test in past 12 months          "

## 2020-02-04 ENCOUNTER — TELEPHONE (OUTPATIENT)
Dept: SCHEDULING | Facility: CLINIC | Age: 60
End: 2020-02-04

## 2020-02-04 NOTE — TELEPHONE ENCOUNTER
2/4/2020    Call Regarding Preventive Health Screening Colonoscopy and ReattributionPhysical       Attempt 3    Message on voicemail    Comments:       Outreach   NAS

## 2020-02-15 DIAGNOSIS — K21.9 GASTROESOPHAGEAL REFLUX DISEASE, ESOPHAGITIS PRESENCE NOT SPECIFIED: ICD-10-CM

## 2020-02-16 NOTE — TELEPHONE ENCOUNTER
"Requested Prescriptions   Pending Prescriptions Disp Refills     omeprazole (PRILOSEC) 20 MG DR capsule [Pharmacy Med Name: OMEPRAZOLE 20MG CPDR] 30 capsule 5     Sig: TAKE ONE CAPSULE BY MOUTH EVERY DAY   Last Written Prescription Date:  4/25/2019  Last Fill Quantity: 30,  # refills: 5   Last Office Visit: 5/17/2019   Future Office Visit:         PPI Protocol Passed - 2/15/2020 11:24 AM        Passed - Not on Clopidogrel (unless Pantoprazole ordered)        Passed - No diagnosis of osteoporosis on record        Passed - Recent (12 mo) or future (30 days) visit within the authorizing provider's specialty     Patient has had an office visit with the authorizing provider or a provider within the authorizing providers department within the previous 12 mos or has a future within next 30 days. See \"Patient Info\" tab in inbasket, or \"Choose Columns\" in Meds & Orders section of the refill encounter.              Passed - Medication is active on med list        Passed - Patient is age 18 or older        Passed - No active pregnacy on record        Passed - No positive pregnancy test in past 12 months          "

## 2020-04-02 ENCOUNTER — TELEPHONE (OUTPATIENT)
Dept: INTERNAL MEDICINE | Facility: CLINIC | Age: 60
End: 2020-04-02

## 2020-04-02 NOTE — TELEPHONE ENCOUNTER
"Imelda Lira CMA contacted Devi on 04/02/20 and left a message. If patient calls back please relay message in bold.     \"I would be able to write a letter  stating that pt has a history of diabetes and that it is one of the conditions that puts her into a higher risk category for complications if she were to be infected with Covid19 but cannot medically say that she is not able to work. . However, fear of getting sick  would not be a reason for her to get unemployment benefits  As she would be leaving the job by her own decision rather than being laid off due to the company's decision so, unless her employer would be willing to have her work in a different position that might have lesser risks for exposure, her leaving her job would likely not come with any financial protection. If pt has not talked with her human resources dept at work about her concerns, would recommend she first do so\"  "

## 2020-04-02 NOTE — TELEPHONE ENCOUNTER
Reason for Call:  Patient request    Detailed comments: Patient is concerned for her health because of her diabetes. 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  Patient would like to have a note that would excuse her from work, due to her high risk of josy the Corona virus.    Phone Number Patient can be reached at: Home number on file [phone] (home)    Best Time: anytime    Can we leave a detailed message on this number? YES    Call taken on 4/2/2020 at 2:30 PM by Beto Amador

## 2020-04-02 NOTE — TELEPHONE ENCOUNTER
I would be able to write a letter  stating that pt has a history of diabetes and that it is one of the conditions that puts her into a higher risk category for complications if she were to be infected with Covid19 but cannot medically say that she is not able to work. . However, fear of getting sick  would not be a reason for her to get unemployment benefits  As she would be leaving the job by her own decision rather than being laid off due to the company's decision so, unless her employer would be willing to have her work in a different position that might have lesser risks for exposure, her leaving her job would likely not come with any financial protection. If pt has not talked with her human resources dept at work about her concerns, would recommend she first do so

## 2020-04-02 NOTE — LETTER
[facility]  [address] [address]  [phone]        2020    Regarding   Devi Jorge  [address]  [facility] [address]   [birth year]    Dear Devi,    As your health care provider, I am concerned that your underlying medical condition puts you at particularly high risk for serious complications should you contract a COVID-19 infection.     Specifically, you have the following conditions/diagnoses, included as high risk conditions per the Centers for Disease Control and Prevention at CDC.gov.     1. Type 2 insulin dependent diabetes  2.  Obesity    COVID-19 is a new disease and there is limited information regarding risk factors for severe disease. Based on currently available information and clinical expertise, older adults and people of any age who have serious underlying medical conditions might be at higher risk for severe illness from COVID-19.     It is my medical opinion that you should avoid close contact with others and work from home if your job allows during this COVID-19 pandemic.             Emanuel Mcclure MD    [facility]

## 2020-05-12 DIAGNOSIS — E11.9 TYPE 2 DIABETES MELLITUS WITHOUT COMPLICATION, WITH LONG-TERM CURRENT USE OF INSULIN (H): Primary | ICD-10-CM

## 2020-05-12 DIAGNOSIS — Z79.4 TYPE 2 DIABETES MELLITUS WITHOUT COMPLICATION, WITH LONG-TERM CURRENT USE OF INSULIN (H): Primary | ICD-10-CM

## 2020-05-12 DIAGNOSIS — I10 HYPERTENSION, UNSPECIFIED TYPE: ICD-10-CM

## 2020-05-12 NOTE — TELEPHONE ENCOUNTER
"Requested Prescriptions   Pending Prescriptions Disp Refills     losartan (COZAAR) 50 MG tablet [Pharmacy Med Name: LOSARTAN POTASSIUM 50MG TABS] 30 tablet 11     Sig: TAKE ONE TABLET BY MOUTH EVERY DAY       Angiotensin-II Receptors Failed - 5/12/2020  5:02 AM        Failed - Last blood pressure under 140/90 in past 12 months     BP Readings from Last 3 Encounters:   05/17/19 142/76   04/25/19 170/70   02/26/18 146/88                 Failed - Normal serum creatinine on file in past 12 months     Recent Labs   Lab Test 04/25/19  0954   CR 0.87       Ok to refill medication if creatinine is low          Failed - Normal serum potassium on file in past 12 months     Recent Labs   Lab Test 04/25/19  0954   POTASSIUM 4.2                    Passed - Recent (12 mo) or future (30 days) visit within the authorizing provider's specialty     Patient has had an office visit with the authorizing provider or a provider within the authorizing providers department within the previous 12 mos or has a future within next 30 days. See \"Patient Info\" tab in inbasket, or \"Choose Columns\" in Meds & Orders section of the refill encounter.              Passed - Medication is active on med list        Passed - Patient is age 18 or older        Passed - No active pregnancy on record        Passed - No positive pregnancy test in past 12 months             "

## 2020-05-12 NOTE — LETTER
[facility]  [address] [address]  [phone]      5/19/2020       Devi [address]  [facility] [address]              Dear Ms. Patel,    This letter comes as a reminder that you are due to schedule an office appointment,  related to your diagnosis of Diabetes. Before any refills can be done you will need to make an appointment.Please call [phone] to schedule an appointment.    The following laboratory test(s) should be done fasting:   Lipid Profile (cholesterol panel), A1C, Basic Metabolic Panel and Thyroid Function         If you have had recent lab tests done somewhere else, or feel that you have received his message in err        Sincerely,      Internal Medicine Department  [facility]

## 2020-05-12 NOTE — TELEPHONE ENCOUNTER
Routing refill request to provider for review/approval because:  Labs not current:  Creat, potassium  BP out of range  Due for appt and BP check- last visit 1 year ago

## 2020-05-13 NOTE — TELEPHONE ENCOUNTER
Left message on machine to call back  If pt calls back please schedule fasting lab and f/u with pcp.     Please ask if pt has a BP cuff if not schedule for curbside before f/u with PCP. Also inform pt to check blood sugars four times a day (before meals and bedtime) for the 4 days prior to the appointment with me,  write them down and have them available to review with the appointment

## 2020-05-13 NOTE — TELEPHONE ENCOUNTER
Pt not seen in a year. Overdue for fasting labs also. Call pt and see if has blood pressure monitor at home. If so, ask if has checked BP recently and get numbers. If not, then will need nurse only BP check with [facility] clinic. Also needs fasting lab appt set up and virtual video visit  With MD (or tele if doesn't have video access). Get appts set up and info re: if checking BPS at all and then route back to MD to address  BP med refill as appropriate. Also inform pt to check blood sugars four times a day (before meals and bedtime) for the 4 days prior to the appointment with me,  write them down and have them available to review with the appointment

## 2020-05-18 RX ORDER — LOSARTAN POTASSIUM 50 MG/1
TABLET ORAL
Qty: 30 TABLET | Refills: 0 | OUTPATIENT
Start: 2020-05-18

## 2020-05-19 NOTE — TELEPHONE ENCOUNTER
Send letter to pt instructing her that has been over 1 year since seen and pt to call clinic to schedule fasting  Lab appt for blood/urine and also appt with MD a few days after labs done to review results. Appts to be in the next 30 days. Check blood sugars four times a day (before meals and bedtime) for the 4 days prior to the appointment with me,  write them down and have them available to review with the appointment. Also instruct pt that after she calls and makes appts, then she is to request to be transferred  To triage nurseline to update MD that appts have been scheduled and then refill request will then be reviewed again. Med refill declined at this time until future appts scheduled

## 2020-05-20 DIAGNOSIS — E78.5 HYPERLIPIDEMIA WITH TARGET LDL LESS THAN 100: ICD-10-CM

## 2020-05-20 DIAGNOSIS — I10 HYPERTENSION, UNSPECIFIED TYPE: ICD-10-CM

## 2020-05-20 NOTE — TELEPHONE ENCOUNTER
Patient called back, she does not have health insurance right now and requesting a chacorta refill for another month.     BGs are:     5/20   5/19   5/18 ,   5/17 ,   5/16 AM 94,     Pt does not usually check BP, but latest BP were 120s - 130s/80s.     Patient realizes that she needs to be seen and is working on this.

## 2020-06-13 DIAGNOSIS — Z79.4 TYPE 2 DIABETES MELLITUS WITHOUT COMPLICATION, WITH LONG-TERM CURRENT USE OF INSULIN (H): ICD-10-CM

## 2020-06-13 DIAGNOSIS — E78.5 HYPERLIPIDEMIA WITH TARGET LDL LESS THAN 100: ICD-10-CM

## 2020-06-13 DIAGNOSIS — E11.9 TYPE 2 DIABETES MELLITUS WITHOUT COMPLICATION, WITH LONG-TERM CURRENT USE OF INSULIN (H): ICD-10-CM

## 2020-06-13 DIAGNOSIS — E03.9 HYPOTHYROIDISM, UNSPECIFIED TYPE: Primary | ICD-10-CM

## 2020-06-16 NOTE — TELEPHONE ENCOUNTER
Metformin 1000mg     Last Written Prescription Date:  5/14/19  Last Fill Quantity: 180,   # refills: 3  Last Office Visit: 5/17/19  Future Office visit:  None scheduled     Routing refill request to provider for review/approval because:  Due for A1c, SrCr and office visit     Lab Results   Component Value Date    A1C 7.9 04/25/2019    A1C 8.4 01/16/2018    A1C 8.6 03/15/2017    A1C 7.7 07/07/2016    A1C 7.1 06/26/2015     Creatinine   Date Value Ref Range Status   04/25/2019 0.87 0.52 - 1.04 mg/dL Final      Brenda Condon, PharmD, BCACP  Medication Therapy Management Provider  Pager: [phone]

## 2020-06-17 RX ORDER — LOSARTAN POTASSIUM 50 MG/1
50 TABLET ORAL DAILY
Qty: 30 TABLET | Refills: 11 | OUTPATIENT
Start: 2020-06-17

## 2020-06-17 NOTE — TELEPHONE ENCOUNTER
Patient needs virtual follow-up with PCP (Ren) as it has been more than one year since she has been seen.

## 2020-06-22 DIAGNOSIS — E11.9 TYPE 2 DIABETES MELLITUS WITHOUT COMPLICATION, WITH LONG-TERM CURRENT USE OF INSULIN (H): ICD-10-CM

## 2020-06-22 DIAGNOSIS — E78.5 HYPERLIPIDEMIA WITH TARGET LDL LESS THAN 100: ICD-10-CM

## 2020-06-22 DIAGNOSIS — Z79.4 TYPE 2 DIABETES MELLITUS WITHOUT COMPLICATION, WITH LONG-TERM CURRENT USE OF INSULIN (H): ICD-10-CM

## 2020-06-22 DIAGNOSIS — E03.9 HYPOTHYROIDISM, UNSPECIFIED TYPE: ICD-10-CM

## 2020-06-22 LAB
ALBUMIN SERPL-MCNC: 3.7 G/DL (ref 3.4–5)
ALP SERPL-CCNC: 114 U/L (ref 40–150)
ALT SERPL W P-5'-P-CCNC: 15 U/L (ref 0–50)
ANION GAP SERPL CALCULATED.3IONS-SCNC: 6 MMOL/L (ref 3–14)
AST SERPL W P-5'-P-CCNC: 11 U/L (ref 0–45)
BILIRUB SERPL-MCNC: 0.5 MG/DL (ref 0.2–1.3)
BUN SERPL-MCNC: 19 MG/DL (ref 7–30)
CALCIUM SERPL-MCNC: 8.9 MG/DL (ref 8.5–10.1)
CHLORIDE SERPL-SCNC: 106 MMOL/L (ref 94–109)
CHOLEST SERPL-MCNC: 132 MG/DL
CO2 SERPL-SCNC: 25 MMOL/L (ref 20–32)
CREAT SERPL-MCNC: 0.98 MG/DL (ref 0.52–1.04)
CREAT UR-MCNC: 139 MG/DL
GFR SERPL CREATININE-BSD FRML MDRD: 63 ML/MIN/{1.73_M2}
GLUCOSE SERPL-MCNC: 153 MG/DL (ref 70–99)
HBA1C MFR BLD: 8.7 % (ref 0–5.6)
HDLC SERPL-MCNC: 37 MG/DL
LDLC SERPL CALC-MCNC: 66 MG/DL
MICROALBUMIN UR-MCNC: 44 MG/L
MICROALBUMIN/CREAT UR: 31.65 MG/G CR (ref 0–25)
NONHDLC SERPL-MCNC: 95 MG/DL
POTASSIUM SERPL-SCNC: 3.8 MMOL/L (ref 3.4–5.3)
PROT SERPL-MCNC: 8.1 G/DL (ref 6.8–8.8)
SODIUM SERPL-SCNC: 137 MMOL/L (ref 133–144)
T4 FREE SERPL-MCNC: 0.98 NG/DL (ref 0.76–1.46)
TRIGL SERPL-MCNC: 146 MG/DL
TSH SERPL DL<=0.005 MIU/L-ACNC: 5 MU/L (ref 0.4–4)

## 2020-06-22 PROCEDURE — 82043 UR ALBUMIN QUANTITATIVE: CPT | Performed by: INTERNAL MEDICINE

## 2020-06-22 PROCEDURE — 36415 COLL VENOUS BLD VENIPUNCTURE: CPT | Performed by: INTERNAL MEDICINE

## 2020-06-22 PROCEDURE — 83036 HEMOGLOBIN GLYCOSYLATED A1C: CPT | Performed by: INTERNAL MEDICINE

## 2020-06-22 PROCEDURE — 80053 COMPREHEN METABOLIC PANEL: CPT | Performed by: INTERNAL MEDICINE

## 2020-06-22 PROCEDURE — 84439 ASSAY OF FREE THYROXINE: CPT | Performed by: INTERNAL MEDICINE

## 2020-06-22 PROCEDURE — 84443 ASSAY THYROID STIM HORMONE: CPT | Performed by: INTERNAL MEDICINE

## 2020-06-22 PROCEDURE — 80061 LIPID PANEL: CPT | Performed by: INTERNAL MEDICINE

## 2020-06-22 RX ORDER — LOSARTAN POTASSIUM 50 MG/1
50 TABLET ORAL DAILY
Qty: 30 TABLET | Refills: 0 | Status: SHIPPED | OUTPATIENT
Start: 2020-06-22 | End: 2020-07-06 | Stop reason: DRUGHIGH

## 2020-06-22 RX ORDER — ATORVASTATIN CALCIUM 10 MG/1
10 TABLET, FILM COATED ORAL DAILY
Qty: 30 TABLET | Refills: 0 | Status: SHIPPED | OUTPATIENT
Start: 2020-06-22 | End: 2020-07-06

## 2020-06-22 NOTE — TELEPHONE ENCOUNTER
Pt has labs scheduled for today and appt with me in July. Metformin refilled for 30 days at pharmacy. I also refilled pt's Losartan and Atorvastatin. Will address future refills after labs seen and pt seen in clinic since > 1 year since last visit. Check blood sugars four times a day (before meals and bedtime) for the 4 days prior to the appointment with me,  write them down and have them available to review with the appointment. Inform pt

## 2020-06-22 NOTE — TELEPHONE ENCOUNTER
Appts scheduled. Losartan and Atorvastatin refilled. Pt informed  Through other RF encounter 6/13/20

## 2020-07-06 ENCOUNTER — OFFICE VISIT (OUTPATIENT)
Dept: INTERNAL MEDICINE | Facility: CLINIC | Age: 60
End: 2020-07-06

## 2020-07-06 VITALS
RESPIRATION RATE: 16 BRPM | HEART RATE: 92 BPM | OXYGEN SATURATION: 98 % | BODY MASS INDEX: 32.25 KG/M2 | DIASTOLIC BLOOD PRESSURE: 78 MMHG | WEIGHT: 182 LBS | TEMPERATURE: 97.3 F | HEIGHT: 63 IN | SYSTOLIC BLOOD PRESSURE: 136 MMHG

## 2020-07-06 DIAGNOSIS — K21.9 GASTROESOPHAGEAL REFLUX DISEASE, ESOPHAGITIS PRESENCE NOT SPECIFIED: ICD-10-CM

## 2020-07-06 DIAGNOSIS — E78.5 HYPERLIPIDEMIA WITH TARGET LDL LESS THAN 100: ICD-10-CM

## 2020-07-06 DIAGNOSIS — R80.9 PROTEINURIA, UNSPECIFIED TYPE: ICD-10-CM

## 2020-07-06 DIAGNOSIS — Z79.4 TYPE 2 DIABETES MELLITUS WITHOUT COMPLICATION, WITH LONG-TERM CURRENT USE OF INSULIN (H): ICD-10-CM

## 2020-07-06 DIAGNOSIS — E11.9 TYPE 2 DIABETES MELLITUS WITHOUT COMPLICATION, WITH LONG-TERM CURRENT USE OF INSULIN (H): ICD-10-CM

## 2020-07-06 DIAGNOSIS — I10 ESSENTIAL HYPERTENSION: ICD-10-CM

## 2020-07-06 DIAGNOSIS — R87.610 ATYPICAL SQUAMOUS CELLS OF UNDETERMINED SIGNIFICANCE ON CYTOLOGIC SMEAR OF CERVIX (ASC-US): ICD-10-CM

## 2020-07-06 DIAGNOSIS — E03.9 HYPOTHYROIDISM, UNSPECIFIED TYPE: ICD-10-CM

## 2020-07-06 DIAGNOSIS — Z12.39 SCREENING BREAST EXAMINATION: ICD-10-CM

## 2020-07-06 PROCEDURE — 99214 OFFICE O/P EST MOD 30 MIN: CPT | Performed by: INTERNAL MEDICINE

## 2020-07-06 RX ORDER — LOSARTAN POTASSIUM 100 MG/1
100 TABLET ORAL DAILY
Qty: 90 TABLET | Refills: 3 | Status: SHIPPED | OUTPATIENT
Start: 2020-07-06 | End: 2021-08-10

## 2020-07-06 RX ORDER — ATORVASTATIN CALCIUM 10 MG/1
10 TABLET, FILM COATED ORAL DAILY
Qty: 90 TABLET | Refills: 3 | Status: SHIPPED | OUTPATIENT
Start: 2020-07-06 | End: 2020-07-14

## 2020-07-06 ASSESSMENT — MIFFLIN-ST. JEOR: SCORE: 1364.68

## 2020-07-06 NOTE — PROGRESS NOTES
Maxine Patel is a 60 year old female who presents to clinic today for the following health issues:    HPI   Diabetes Follow-up    How often are you checking your blood sugar? Two times daily  Blood sugar testing frequency justification:  Provider's Request  What time of day are you checking your blood sugars (select all that apply)?  At bedtime and in the morning  Have you had any blood sugars above 200?  No  Have you had any blood sugars below 70?  No    What symptoms do you notice when your blood sugar is low?  Blurred vision    What concerns do you have today about your diabetes? None     Do you have any of these symptoms? (Select all that apply)  Blurry vision    Have you had a diabetic eye exam in the last 12 months? No              Hyperlipidemia Follow-Up      Are you regularly taking any medication or supplement to lower your cholesterol?   Yes- Atorvastatin    Are you having muscle aches or other side effects that you think could be caused by your cholesterol lowering medication?  Yes- Cramps, but maybe related to Gardening    Hypertension Follow-up      Do you check your blood pressure regularly outside of the clinic? Yes     Are you following a low salt diet? Yes    Are your blood pressures ever more than 140 on the top number (systolic) OR more   than 90 on the bottom number (diastolic), for example 140/90? Yes -on 07/02/2020    BP Readings from Last 2 Encounters:   07/06/20 136/78   05/17/19 142/76     Hemoglobin A1C (%)   Date Value   06/22/2020 8.7 (H)   04/25/2019 7.9 (H)     LDL Cholesterol Calculated (mg/dL)   Date Value   06/22/2020 66   04/25/2019 66         How many servings of fruits and vegetables do you eat daily?  4 or more    On average, how many sweetened beverages do you drink each day (Examples: soda, juice, sweet tea, etc.  Do NOT count diet or artificially sweetened beverages)?   0    How many days per week do you exercise enough to make your heart beat faster? 7    How  many minutes a day do you exercise enough to make your heart beat faster? 30 - 60    How many days per week do you miss taking your medication? 0      Pt's past medical history, family history, habits, medications and allergies were reviewed with the patient today.  See snap shot for  HCM status. Most recent lab results reviewed with pt. Problem list and histories reviewed & adjusted, as indicated.  Additional history as below:      Sugars:  118,141,133,195  123,150,140,172  X,130,150,174  121,141,129,x  130    Blood sugars more recently better than were earlier this spring when patient had poor diet during periods of stress following the death of her daughter.  Has improved diet now and mood doing much better now. Denies true depression at this time. No suicidal ideation.  Weight down another 3 pounds since last visit.  Overall, patient has lost 12 pounds       Lab Results   Component Value Date     06/22/2020     Lab Results   Component Value Date    A1C 8.7 06/22/2020     Lab Results   Component Value Date    CHOL 132 06/22/2020     Lab Results   Component Value Date    LDL 66 06/22/2020     Lab Results   Component Value Date    HDL 37 06/22/2020     Lab Results   Component Value Date    TRIG 146 06/22/2020     Lab Results   Component Value Date    CR 0.98 06/22/2020     Lab Results   Component Value Date    ALT 15 06/22/2020     Lab Results   Component Value Date    AST 11 06/22/2020     Lab Results   Component Value Date    MICROL 44 06/22/2020     Lab Results   Component Value Date    TSH 5.00 06/22/2020       Using 25 units of Basaglar  and Humalog 2/2/2 with meals.  Will be changing to Lantus in the future for long-acting insulin but still has some Basaglar at home so using this up first  Denies CP, SOB, abdominal pain, polyuria, polydipsia, vision changes, extremity numbness/parasthesias or skin problems.  Energy good. Improving weight as above.  Denies constipation or cold intolerance.  TSH  "minimally elevated as above      Additional ROS:   Constitutional, HEENT, Cardiovascular, Pulmonary, GI and , Neuro, MSK and Psych review of systems/symptoms are otherwise negative or unchanged from previous, except as noted above.      OBJECTIVE:  /78   Pulse 92   Temp 97.3  F (36.3  C) (Temporal)   Resp 16   Ht 1.6 m (5' 3\")   Wt 82.6 kg (182 lb)   LMP 07/10/2009   SpO2 98%   BMI 32.24 kg/m     Estimated body mass index is 32.24 kg/m  as calculated from the following:    Height as of this encounter: 1.6 m (5' 3\").    Weight as of this encounter: 82.6 kg (182 lb).     Neck: no adenopathy. Thyroid normal to palpation. No bruits  Pulm: Lungs clear to auscultation   CV: Regular rates and rhythm  GI: Soft, mildly obese, nontender, Normal active bowel sounds, No hepatosplenomegaly or masses palpable  Ext: Peripheral pulses intact. No edema.  Neuro: Normal strength and tone, sensory exam grossly normal    Assessment/Plan: (See plan discussion below for further details)  1. Type 2 diabetes mellitus without complication, with long-term current use of insulin (H)  Last A1c above goal but blood sugars now doing much better as per glucometer results above.  Continue current insulin therapy and metformin and improve diet.  Recheck A1c 3 months  - insulin glargine (LANTUS SOLOSTAR) 100 UNIT/ML pen; 25 units at bedtime  Dispense: 30 mL; Refill: 3  - Insulin Lispro, 0.5 Unit Dial, 100 UNIT/ML SOPN; Inject 2-3 Units Subcutaneous 3 times daily (with meals) 4 units with breakfast, 6-8 units with lunch and 4-5 units with supper  Dispense: 15 mL; Refill: 3  - metFORMIN (GLUCOPHAGE) 1000 MG tablet; TAKE ONE TABLET BY MOUTH TWICE A DAY WITH MEALS  Dispense: 180 tablet; Refill: 3  - Hemoglobin A1c; Future    2. Essential hypertension  Needs improved control.  Increase losartan to 100 mg daily.  - losartan (COZAAR) 100 MG tablet; Take 1 tablet (100 mg) by mouth daily  Dispense: 90 tablet; Refill: 3    3. Proteinuria, " unspecified type  Mildly elevated.  Will increase losartan.  Recheck in 3 months  - losartan (COZAAR) 100 MG tablet; Take 1 tablet (100 mg) by mouth daily  Dispense: 90 tablet; Refill: 3  - Albumin Random Urine Quantitative with Creat Ratio; Future    4. Hyperlipidemia with target LDL less than 100  Controlled.  Continue medication.  Repeat lab 1 year  - atorvastatin (LIPITOR) 10 MG tablet; Take 1 tablet (10 mg) by mouth daily  Dispense: 90 tablet; Refill: 3  - Lipid panel reflex to direct LDL Fasting; Future  - Comprehensive metabolic panel; Future    5. Hypothyroidism, unspecified type  TSH minimally elevated.  No indication for thyroid hormone replacement this time.  Future lab recheck as ordered  - TSH with free T4 reflex; Future    6. Gastroesophageal reflux disease, esophagitis presence not specified  Controlled.  Has recurrence of symptoms off medication.  Continue PPI therapy  - omeprazole (PRILOSEC) 20 MG DR capsule; TAKE ONE CAPSULE BY MOUTH EVERY DAY  Dispense: 90 capsule; Refill: 3    7. Screening breast examination  - *MA Screening Digital Bilateral; Future     8. Atypical squamous cells of undetermined significance on cytologic smear of cervix (ASC-US)   hx abnormal Pap 2018 with positive other HPV.  Colposcopy has been recommended by gynecology but never done.  Patient to schedule appointment with [facility] for repeat pelvic examination/colposcopy    Plan discussion:    Increase Losartan to 1.5 tabs (75mg) daily for 5 days. Then if feeling OK, increase to 100mg (2 tabs) daily until run out. Then change to 100mg tab, 1 tab daily for blood pressure and kidneys. If side effects with higher doses, then let me know   Increase supper/dinner Humalog to 3 units. Continue 2 units with other two meals  Continue other medications.  Refills done   Repeat nonfasting blood and urine lab in mid October. Also stool test for colon cancer screening as previously ordered. Future colonoscopy  when  willing    Schedule a follow up appointment with me in clinic a few days after these future labs are drawn to review results and follow-up with your blood blood pressure and diabetes   Flu vaccine this Fall  Eye exam this year when able  [facility]  Schedule appointment with [facility] regarding history of abnormal Pap smear and need for colposcopy         Emanuel Mcclure MD  Internal Medicine Department  [facility]    (Chart documentation was completed, in part, with [employer] voice-recognition software. Even though reviewed, some grammatical, spelling, and word errors may remain.)

## 2020-07-06 NOTE — PATIENT INSTRUCTIONS
Increase Losartan to 1.5 tabs (75mg) daily for 5 days. Then if feeling OK, increase to 100mg (2 tabs) daily until run out. Then change to 100mg tab, 1 tab daily for blood pressure and kidneys. If side effects with higher doses, then let me know   Increase supper/dinner Humalog to 3 units. Continue 2 units with other two meals  Continue other medications.  Refills done   Repeat nonfasting blood and urine lab in mid October. Also stool test for colon cancer screening as previously ordered. Future colonoscopy  when  willing   Schedule a follow up appointment with me in clinic a few days after these future labs are drawn to review results and follow-up with your blood blood pressure and diabetes   Flu vaccine this Fall  Eye exam this year when able  [facility]ogram - [facility]  Schedule appointment with [facility] regarding history of abnormal Pap smear and need for colposcopy

## 2020-07-12 PROBLEM — R80.9 PROTEINURIA, UNSPECIFIED TYPE: Status: ACTIVE | Noted: 2020-07-12

## 2020-07-12 PROBLEM — R87.610 ATYPICAL SQUAMOUS CELLS OF UNDETERMINED SIGNIFICANCE ON CYTOLOGIC SMEAR OF CERVIX (ASC-US): Status: ACTIVE | Noted: 2020-07-12

## 2020-07-14 DIAGNOSIS — E78.5 HYPERLIPIDEMIA WITH TARGET LDL LESS THAN 100: ICD-10-CM

## 2020-07-14 RX ORDER — ATORVASTATIN CALCIUM 10 MG/1
TABLET, FILM COATED ORAL
Qty: 30 TABLET | Refills: 11 | Status: SHIPPED | OUTPATIENT
Start: 2020-07-14 | End: 2021-08-10

## 2021-08-01 DIAGNOSIS — Z79.4 TYPE 2 DIABETES MELLITUS WITHOUT COMPLICATION, WITH LONG-TERM CURRENT USE OF INSULIN (H): ICD-10-CM

## 2021-08-01 DIAGNOSIS — E78.5 HYPERLIPIDEMIA WITH TARGET LDL LESS THAN 100: ICD-10-CM

## 2021-08-01 DIAGNOSIS — K21.9 GASTROESOPHAGEAL REFLUX DISEASE: ICD-10-CM

## 2021-08-01 DIAGNOSIS — I10 ESSENTIAL HYPERTENSION: ICD-10-CM

## 2021-08-01 DIAGNOSIS — E11.9 TYPE 2 DIABETES MELLITUS WITHOUT COMPLICATION, WITH LONG-TERM CURRENT USE OF INSULIN (H): ICD-10-CM

## 2021-08-01 DIAGNOSIS — K21.9 GASTROESOPHAGEAL REFLUX DISEASE WITHOUT ESOPHAGITIS: Primary | ICD-10-CM

## 2021-08-01 DIAGNOSIS — R80.9 PROTEINURIA, UNSPECIFIED TYPE: ICD-10-CM

## 2021-08-01 NOTE — TELEPHONE ENCOUNTER
Overdue for fasting labs regarding diabetes and appointment with physician in clinic.  Schedule patient for fasting lab appointment (blood and urine) in the next month followed by an appointment with me and radiology appointment for mammogram in the week after labs are done. Check blood sugars four times a day (before meals and bedtime) for the 4 days prior to the appointment with me,  write them down and have them available to review with the appointment  After appointments are scheduled, then route refill request back to me to address

## 2021-08-01 NOTE — LETTER
[facility]  [address] [address]  [phone]      8/10/2021       Devi [address] [place]  [place] [address]        Dear Devi,  While refilling your prescription today, we noticed that you are due for a follow up appointment and fasting labs with Dr. Mcclure. We will NOT refill your prescription until an appointment is scheduled with in the next 30 days. Please call to schedule to a fasting lab appointment with in the next month along with a separate follow up appointment with Dr. Mcclure a few days after labs are complete to review those results and address other medical issues as needed.     Check blood sugars four times a day (before meals and bedtime) for the 4 days prior to the appointment with me,  write them down and have them available to review with the appointment.    Taking care of your health is important to us and we look forward to seeing you in the near future.  Please call us at [phone] or [phone] (or use [employer]) to schedule an appointment.     Please disregard this notice if you have already made an appointment.        Sincerely,      Emanuel Mcclure MD/Imelda Lira, [facility]  Internal Medicine

## 2021-08-10 RX ORDER — ATORVASTATIN CALCIUM 10 MG/1
TABLET, FILM COATED ORAL
Qty: 90 TABLET | Refills: 0 | Status: SHIPPED | OUTPATIENT
Start: 2021-08-10 | End: 2021-09-03

## 2021-08-10 RX ORDER — LOSARTAN POTASSIUM 100 MG/1
TABLET ORAL
Qty: 90 TABLET | Refills: 0 | Status: SHIPPED | OUTPATIENT
Start: 2021-08-10 | End: 2021-09-03

## 2021-08-10 NOTE — TELEPHONE ENCOUNTER
Pt called to check status of this request, states they are out of Rxs, but will be picking up an emergency 3 day supply from the pharmacy today 8/10.     Pt is scheduled for fasting labs on 8/30 and a follow up visit on 9/3.This was the soonest available for her due to her job. Please contact if there are further questions/concerns.    Willow Kimbrough on 8/10/2021 at 2:20 PM

## 2021-08-10 NOTE — TELEPHONE ENCOUNTER
Routing refill request to provider for review/approval because:  Labs not current:    Puja Marroquin RN

## 2021-08-30 ENCOUNTER — LAB (OUTPATIENT)
Dept: LAB | Facility: CLINIC | Age: 61
End: 2021-08-30
Payer: COMMERCIAL

## 2021-08-30 DIAGNOSIS — E78.5 HYPERLIPIDEMIA WITH TARGET LDL LESS THAN 100: ICD-10-CM

## 2021-08-30 DIAGNOSIS — E03.9 HYPOTHYROIDISM, UNSPECIFIED TYPE: ICD-10-CM

## 2021-08-30 DIAGNOSIS — Z79.4 TYPE 2 DIABETES MELLITUS WITHOUT COMPLICATION, WITH LONG-TERM CURRENT USE OF INSULIN (H): ICD-10-CM

## 2021-08-30 DIAGNOSIS — E11.9 TYPE 2 DIABETES MELLITUS WITHOUT COMPLICATION, WITH LONG-TERM CURRENT USE OF INSULIN (H): ICD-10-CM

## 2021-08-30 DIAGNOSIS — R80.9 PROTEINURIA, UNSPECIFIED TYPE: ICD-10-CM

## 2021-08-30 LAB
ALBUMIN SERPL-MCNC: 3.6 G/DL (ref 3.4–5)
ALP SERPL-CCNC: 110 U/L (ref 40–150)
ALT SERPL W P-5'-P-CCNC: 25 U/L (ref 0–50)
ANION GAP SERPL CALCULATED.3IONS-SCNC: 5 MMOL/L (ref 3–14)
AST SERPL W P-5'-P-CCNC: 16 U/L (ref 0–45)
BILIRUB SERPL-MCNC: 0.4 MG/DL (ref 0.2–1.3)
BUN SERPL-MCNC: 18 MG/DL (ref 7–30)
CALCIUM SERPL-MCNC: 8.9 MG/DL (ref 8.5–10.1)
CHLORIDE BLD-SCNC: 104 MMOL/L (ref 94–109)
CHOLEST SERPL-MCNC: 159 MG/DL
CO2 SERPL-SCNC: 27 MMOL/L (ref 20–32)
CREAT SERPL-MCNC: 1.04 MG/DL (ref 0.52–1.04)
CREAT UR-MCNC: 255 MG/DL
FASTING STATUS PATIENT QL REPORTED: YES
GFR SERPL CREATININE-BSD FRML MDRD: 58 ML/MIN/1.73M2
GLUCOSE BLD-MCNC: 184 MG/DL (ref 70–99)
HBA1C MFR BLD: 8.6 % (ref 0–5.6)
HDLC SERPL-MCNC: 39 MG/DL
LDLC SERPL CALC-MCNC: 80 MG/DL
MICROALBUMIN UR-MCNC: 51 MG/L
MICROALBUMIN/CREAT UR: 20 MG/G CR (ref 0–25)
NONHDLC SERPL-MCNC: 120 MG/DL
POTASSIUM BLD-SCNC: 4.1 MMOL/L (ref 3.4–5.3)
PROT SERPL-MCNC: 7.6 G/DL (ref 6.8–8.8)
SODIUM SERPL-SCNC: 136 MMOL/L (ref 133–144)
T4 FREE SERPL-MCNC: 0.93 NG/DL (ref 0.76–1.46)
TRIGL SERPL-MCNC: 202 MG/DL
TSH SERPL DL<=0.005 MIU/L-ACNC: 5.1 MU/L (ref 0.4–4)

## 2021-08-30 PROCEDURE — 80053 COMPREHEN METABOLIC PANEL: CPT

## 2021-08-30 PROCEDURE — 36415 COLL VENOUS BLD VENIPUNCTURE: CPT

## 2021-08-30 PROCEDURE — 83036 HEMOGLOBIN GLYCOSYLATED A1C: CPT

## 2021-08-30 PROCEDURE — 82043 UR ALBUMIN QUANTITATIVE: CPT

## 2021-08-30 PROCEDURE — 84443 ASSAY THYROID STIM HORMONE: CPT

## 2021-08-30 PROCEDURE — 84439 ASSAY OF FREE THYROXINE: CPT

## 2021-08-30 PROCEDURE — 80061 LIPID PANEL: CPT

## 2021-08-31 NOTE — RESULT ENCOUNTER NOTE
Results reviewed. Will discuss them soon with pt at upcoming scheduled appt 9/3/2021.  See clinic note from that visit for future plan

## 2021-09-03 ENCOUNTER — OFFICE VISIT (OUTPATIENT)
Dept: INTERNAL MEDICINE | Facility: CLINIC | Age: 61
End: 2021-09-03
Payer: COMMERCIAL

## 2021-09-03 VITALS
WEIGHT: 186 LBS | OXYGEN SATURATION: 99 % | TEMPERATURE: 98.4 F | BODY MASS INDEX: 32.95 KG/M2 | HEART RATE: 79 BPM | SYSTOLIC BLOOD PRESSURE: 126 MMHG | DIASTOLIC BLOOD PRESSURE: 74 MMHG

## 2021-09-03 DIAGNOSIS — R10.13 EPIGASTRIC PAIN: ICD-10-CM

## 2021-09-03 DIAGNOSIS — R80.9 PROTEINURIA, UNSPECIFIED TYPE: ICD-10-CM

## 2021-09-03 DIAGNOSIS — R87.610 ATYPICAL SQUAMOUS CELLS OF UNDETERMINED SIGNIFICANCE ON CYTOLOGIC SMEAR OF CERVIX (ASC-US): ICD-10-CM

## 2021-09-03 DIAGNOSIS — I10 ESSENTIAL HYPERTENSION: ICD-10-CM

## 2021-09-03 DIAGNOSIS — E11.9 TYPE 2 DIABETES MELLITUS WITHOUT COMPLICATION, WITH LONG-TERM CURRENT USE OF INSULIN (H): ICD-10-CM

## 2021-09-03 DIAGNOSIS — E78.5 HYPERLIPIDEMIA WITH TARGET LDL LESS THAN 100: ICD-10-CM

## 2021-09-03 DIAGNOSIS — Z12.11 SPECIAL SCREENING FOR MALIGNANT NEOPLASMS, COLON: ICD-10-CM

## 2021-09-03 DIAGNOSIS — K21.9 GASTROESOPHAGEAL REFLUX DISEASE WITHOUT ESOPHAGITIS: ICD-10-CM

## 2021-09-03 DIAGNOSIS — Z12.31 ENCOUNTER FOR SCREENING MAMMOGRAM FOR BREAST CANCER: ICD-10-CM

## 2021-09-03 DIAGNOSIS — Z79.4 TYPE 2 DIABETES MELLITUS WITHOUT COMPLICATION, WITH LONG-TERM CURRENT USE OF INSULIN (H): ICD-10-CM

## 2021-09-03 PROCEDURE — 99214 OFFICE O/P EST MOD 30 MIN: CPT | Performed by: INTERNAL MEDICINE

## 2021-09-03 PROCEDURE — 99207 PR FOOT EXAM NO CHARGE: CPT | Mod: 25 | Performed by: INTERNAL MEDICINE

## 2021-09-03 RX ORDER — LANCETS
EACH MISCELLANEOUS
Qty: 300 EACH | Refills: 3 | Status: SHIPPED | OUTPATIENT
Start: 2021-09-03 | End: 2023-10-16

## 2021-09-03 RX ORDER — LOSARTAN POTASSIUM 100 MG/1
100 TABLET ORAL DAILY
Qty: 90 TABLET | Refills: 3 | Status: SHIPPED | OUTPATIENT
Start: 2021-09-03 | End: 2022-09-28

## 2021-09-03 RX ORDER — ATORVASTATIN CALCIUM 10 MG/1
10 TABLET, FILM COATED ORAL DAILY
Qty: 90 TABLET | Refills: 3 | Status: SHIPPED | OUTPATIENT
Start: 2021-09-03 | End: 2022-11-23

## 2021-09-03 NOTE — PROGRESS NOTES
ASSESSMENT:   1. Type 2 diabetes mellitus without complication, with long-term current use of insulin (H)  Uncontrolled.  Will make slight medications in Humalog dosing to start.  Patient generally starting the day of care regarding blood sugars still continue Lantus at same dosing for now.  Patient to meet with diabetes education to work on further adjustment to Humalog insulin based on carb intake along with working with dietary ideas.  Walking exercise as able.  Recheck A1c 3 months.  Due for eye exam  - insulin glargine (LANTUS SOLOSTAR) 100 UNIT/ML pen; 45 units at bedtime  Dispense: 45 mL; Refill: 3  - insulin lispro (HUMALOG JEFFREY KWIKPEN) 100 UNIT/ML (0.5 unit dial) KWIKPEN; 5 units with breakfast, 4 units with lunch and 8 units with supper  Dispense: 30 mL; Refill: 3  - AMB Adult Diabetes Educator Referral; Future  - Hemoglobin A1c; Future  - blood glucose (NO BRAND SPECIFIED) test strip; Use to test blood sugar 3 times daily or as directed. To accompany: Blood Glucose Monitor Brands: per insurance.  Dispense: 300 strip; Refill: 3  - thin (NO BRAND SPECIFIED) lancets; Use to test blood sugar 3 times daily or as directed. To accompany: Blood Glucose Monitor Brands: per insurance.  Dispense: 300 each; Refill: 3  - FOOT EXAM    2. Epigastric pain  Possible side effect of Metformin versus H. pylori infection versus other.  Denies NSAIDs use.  Will hold Metformin to see if contributing.  Labs ordered.  Continue PPI  - Helicobacter pylori Antigen Stool; Future  - Helicobacter pylori Antigen Stool; Future  - Helicobacter pylori Antigen Stool    3. Hyperlipidemia with target LDL less than 100  Controlled except for elevated triglycerides related to blood sugar levels.  Continue current statin therapy.  Repeat labs annually  - atorvastatin (LIPITOR) 10 MG tablet; Take 1 tablet (10 mg) by mouth daily  Dispense: 90 tablet; Refill: 3    4. Proteinuria, unspecified type  Urine protein now normalized with ARB use.   Continue current medication.  Monitor annually  - losartan (COZAAR) 100 MG tablet; Take 1 tablet (100 mg) by mouth daily  Dispense: 90 tablet; Refill: 3    5. Essential hypertension  Controlled.  Continue current medication  - losartan (COZAAR) 100 MG tablet; Take 1 tablet (100 mg) by mouth daily  Dispense: 90 tablet; Refill: 3    6. Gastroesophageal reflux disease without esophagitis  Epigastric discomfort as above.  GERD however controlled.  Continue PPI therapy  - omeprazole (PRILOSEC) 20 MG DR capsule; Take 1 capsule (20 mg) by mouth daily  Dispense: 90 capsule; Refill: 3    7. Encounter for screening mammogram for breast cancer  Due for breast cancer screening  - *MA Screening Digital Bilateral; Future    8. Special screening for malignant neoplasms, colon  Declines  colonoscopy despite MD recommendation for the procedure. FIT test ordered. Reviewed the reduced sensitivity of the FIT test for colon cancer screening compared to colonoscopy and pt states understanding of this. Patient to inform physician in the future if willing to undergo future colonoscopy.   - Fecal colorectal cancer screen (FIT); Future  - Fecal colorectal cancer screen (FIT)    9. Atypical squamous cells of undetermined significance on cytologic smear of cervix (ASC-US)  History of abnormal Pap smear 2018 with positive HPV and ASCUS.  Was instructed at that time to have gynecology appointment for colposcopy but never done.  Patient is schedule GYN appointment      PLAN:   Stop Metformin to see if affecting stomach and loose stools.  Call the clinic at [phone] to update me in 1 to 2 weeks regarding if the stomach and stool symptoms are better off of Metformin   Continue Lantus 45 units once a day at bedtime for diabetes   Change Humaolog to 5 units with breakfast, 4 units with mid afternoon snack and 8 units with supper/dinner. Take right BEFORE you eat  When checking sugars, check BEFORE you eat  Continue other meds.  Call  [phone]  or use Mychart to schedule a future lab appointment  non-fasting in 3 months for A1C   Schedule a follow up appointment with me in clinic a few days after these future labs are drawn to review results and other medical issues as necessary  Referral to Diabetes education to help with diet ideas and future adjustment of Humalog based on what you are eating  Eye  appt next week as scheduled   [facility]  H pylori stool lab. return to lab   FIT stool test for colon cancer screening. Return to lab.  Inform physician when willing to undergo a colonoscopy in the future    Schedule appointment with the [facility] for gynecology exam and colposcopy/repeat pap. [address], [place].  Call [phone] for an appointment  Future lab orders placed per health care maintenance lab order protocol          (Chart documentation was completed, in part, with [employer] voice-recognition software. Even though reviewed, some grammatical, spelling, and word errors may remain.)    Emanuel Mcclure MD  Internal Medicine Department  [facility] is a 61 year old who presents for the following health issues     HPI     Diabetes Follow-up    How often are you checking your blood sugar? Three times daily  Blood sugar testing frequency justification:  Uncontrolled diabetes  What time of day are you checking your blood sugars (select all that apply)?  Before and after meals  Have you had any blood sugars above 200?  Yes    Have you had any blood sugars below 70?  No    What symptoms do you notice when your blood sugar is low?  Shaky and Dizzy    What concerns do you have today about your diabetes? None     Do you have any of these symptoms? (Select all that apply)  Blurry vision    Have you had a diabetic eye exam in the last 12 months? No          Hyperlipidemia Follow-Up      Are you regularly taking any medication or supplement to  lower your cholesterol?   Yes- atorvastatin    Are you having muscle aches or other side effects that you think could be caused by your cholesterol lowering medication?  No    Hypertension Follow-up      Do you check your blood pressure regularly outside of the clinic? Yes     Are you following a low salt diet? Yes    Are your blood pressures ever more than 140 on the top number (systolic) OR more   than 90 on the bottom number (diastolic), for example 140/90? No    BP Readings from Last 2 Encounters:   07/06/20 136/78   05/17/19 142/76     Hemoglobin A1C (%)   Date Value   08/30/2021 8.6 (H)   06/22/2020 8.7 (H)   04/25/2019 7.9 (H)     LDL Cholesterol Calculated (mg/dL)   Date Value   08/30/2021 80   06/22/2020 66   04/25/2019 66         How many servings of fruits and vegetables do you eat daily?  0-1    On average, how many sweetened beverages do you drink each day (Examples: soda, juice, sweet tea, etc.  Do NOT count diet or artificially sweetened beverages)?   0    How many days per week do you exercise enough to make your heart beat faster? 7    How many minutes a day do you exercise enough to make your heart beat faster? 30 - 60    How many days per week do you miss taking your medication? 0       Most recent lab results reviewed with pt.      Component      Latest Ref Rng & Units 6/22/2020 8/30/2021   Sodium      133 - 144 mmol/L 137 136   Potassium      3.4 - 5.3 mmol/L 3.8 4.1   Chloride      94 - 109 mmol/L 106 104   Carbon Dioxide      20 - 32 mmol/L 25 27   Anion Gap      3 - 14 mmol/L 6 5   Glucose      70 - 99 mg/dL 153 (H) 184 (H)   Urea Nitrogen      7 - 30 mg/dL 19 18   Creatinine      0.52 - 1.04 mg/dL 0.98 1.04   GFR Estimate      >60 mL/min/1.73m2 63 58 (L)   GFR Estimate If Black      >60 mL/min/1.73:m2 73    Calcium      8.5 - 10.1 mg/dL 8.9 8.9   Bilirubin Total      0.2 - 1.3 mg/dL 0.5 0.4   Albumin      3.4 - 5.0 g/dL 3.7 3.6   Protein Total      6.8 - 8.8 g/dL 8.1 7.6   Alkaline  Phosphatase      40 - 150 U/L 114    ALT      0 - 50 U/L 15 25   AST      0 - 45 U/L 11 16   Alkaline Phosphatase      40 - 150 U/L  110   Cholesterol      <200 mg/dL 132 159   Triglycerides      <150 mg/dL 146 202 (H)   HDL Cholesterol      >=50 mg/dL 37 (L) 39 (L)   LDL Cholesterol Calculated      <=100 mg/dL 66 80   Non HDL Cholesterol      <130 mg/dL 95 120   Patient Fasting > 8hrs?        Yes   Creatinine Urine      mg/dL 139 255   Albumin Urine mg/L      mg/L 44 51   Albumin Urine mg/g Cr      0.00 - 25.00 mg/g Cr 31.65 (H) 20.00   TSH      0.40 - 4.00 mU/L 5.00 (H) 5.10 (H)   Hemoglobin A1C      0.0 - 5.6 % 8.7 (H) 8.6 (H)   T4 Free      0.76 - 1.46 ng/dL 0.98 0.93       Denies CP, SOB, abdominal pain, polyuria, polydipsia, vision changes, extremity numbness/parasthesias or skin problems.  Weight up 4 pounds     Sugars all arftear meals excepot AM fasting  130,176,196,203  148,172,188,229  113,144,189,255  111,179,203,214  128,177, 236     Work from 8 AM - 7PM   Breakfast at 10 AM Banana, apple, 2 pieces toast   Has French fries about 2pm   Dinner at 8 pm Some meat (protein), brocolli, cauliflower vegetable, carrots, asparagus or other similar vegetables  Bedtime - applesauce    Eggs bother stomach. Lactose causes mild stomach upset  sometimes    Works  as PCA with TBI patients and schizophrenia. Clients have BMs often and sometimes outside of the bathroom and pt having to help clean up area outside the bathroom along with the client.  Clients also smoke and smell of tobacco. Tends to affect pt's appetite a lot during the workday  Lpong overdue for f/u with GYN. Hx ASCUS with positive HPV in 2018. Never had colposcopy and pap as recommended    Additional ROS:   Constitutional, HEENT, Cardiovascular, Pulmonary, GI and , Neuro, MSK and Psych review of systems/symptoms are otherwise negative or unchanged from previous, except as noted above.      OBJECTIVE:  /74   Pulse 79   Temp 98.4  F (36.9  C)  "(Oral)   Wt 84.4 kg (186 lb)   LMP 07/10/2009   SpO2 99%   BMI 32.95 kg/m     Estimated body mass index is 32.95 kg/m  as calculated from the following:    Height as of 7/6/20: 1.6 m (5' 3\").    Weight as of this encounter: 84.4 kg (186 lb).     Neck: no adenopathy. Thyroid normal to palpation. No bruits  Pulm: Lungs clear to auscultation   CV: Regular rates and rhythm  GI: Soft, obese,  nontender, Normal active bowel sounds, No hepatosplenomegaly or masses palpable  Ext: Peripheral pulses intact. No edema. Normal foot exam except  for some dry skin  Neuro: Normal strength and tone, sensory exam grossly normal              "

## 2021-09-03 NOTE — PATIENT INSTRUCTIONS
Stop Metformin to see if affecting stomach and loose stools.  Call the clinic at [phone] to update me in 1 to 2 weeks regarding if the stomach and stool symptoms are better off of Metformin   Continue Lantus 45 units once a day at bedtime for diabetes   Change Humaolog to 5 units with breakfast, 4 units with mid afternoon snack and 8 units with supper/dinner. Take right BEFORE you eat  When checking sugars, check BEFORE you eat  Continue other meds.  Call  [phone] or use [employer] to schedule a future lab appointment  non-fasting in 3 months for A1C   Schedule a follow up appointment with me in clinic a few days after these future labs are drawn to review results and other medical issues as necessary  Referral to Diabetes education to help with diet ideas and future adjustment of Humalog based on what you are eating  Eye  appt next week as scheduled   [facility] - [facility] at [facility]  H pylori stool lab. return to lab   FIT stool test for colon cancer screening. Return to lab.  Inform physician when willing to undergo a colonoscopy in the future    Schedule appointment with [facility] for gynecology exam and colposcopy/repeat pap. [address], [place], [place].  Call [phone] for an appointment

## 2021-09-07 ENCOUNTER — TELEPHONE (OUTPATIENT)
Dept: INTERNAL MEDICINE | Facility: CLINIC | Age: 61
End: 2021-09-07

## 2021-09-07 DIAGNOSIS — Z79.4 TYPE 2 DIABETES MELLITUS WITHOUT COMPLICATION, WITH LONG-TERM CURRENT USE OF INSULIN (H): Primary | ICD-10-CM

## 2021-09-07 DIAGNOSIS — E11.9 TYPE 2 DIABETES MELLITUS WITHOUT COMPLICATION, WITH LONG-TERM CURRENT USE OF INSULIN (H): Primary | ICD-10-CM

## 2021-09-07 NOTE — TELEPHONE ENCOUNTER
"Per pharmacy     \"please prescribe a new meter:  Either accu-check guide or contour next.  The patient owns a true matrix meter and insurance does not cover that.  We already have test strips/lancets rx's that we can sub new.  Please just send new meter.  Thanks\"    raisa beckham  [phone]   [phone] fx    "

## 2021-09-08 NOTE — TELEPHONE ENCOUNTER
Rx faxed for glucometer per insurance coverage with request that pharmacy inform patient once filled

## 2021-09-10 ENCOUNTER — TELEPHONE (OUTPATIENT)
Dept: INTERNAL MEDICINE | Facility: CLINIC | Age: 61
End: 2021-09-10

## 2021-09-10 NOTE — TELEPHONE ENCOUNTER
Diabetes Education Scheduling Outreach #1:    Call to patient to schedule. Invalid phone number.    Brenda Teixeira [facility]  Diabetes and Nutrition Scheduling

## 2021-10-28 ENCOUNTER — OFFICE VISIT (OUTPATIENT)
Dept: OPTOMETRY | Facility: CLINIC | Age: 61
End: 2021-10-28
Payer: COMMERCIAL

## 2021-10-28 DIAGNOSIS — Z01.01 ENCOUNTER FOR EXAMINATION OF EYES AND VISION WITH ABNORMAL FINDINGS: Primary | ICD-10-CM

## 2021-10-28 DIAGNOSIS — H52.13 MYOPIA OF BOTH EYES: ICD-10-CM

## 2021-10-28 DIAGNOSIS — H52.4 PRESBYOPIA: ICD-10-CM

## 2021-10-28 DIAGNOSIS — E11.9 TYPE 2 DIABETES MELLITUS WITHOUT RETINOPATHY (H): ICD-10-CM

## 2021-10-28 DIAGNOSIS — H52.223 REGULAR ASTIGMATISM OF BOTH EYES: ICD-10-CM

## 2021-10-28 DIAGNOSIS — H25.13 NUCLEAR AGE-RELATED CATARACT, BOTH EYES: ICD-10-CM

## 2021-10-28 PROCEDURE — 92015 DETERMINE REFRACTIVE STATE: CPT | Performed by: OPTOMETRIST

## 2021-10-28 PROCEDURE — 92004 COMPRE OPH EXAM NEW PT 1/>: CPT | Performed by: OPTOMETRIST

## 2021-10-28 ASSESSMENT — REFRACTION_WEARINGRX
OD_CYLINDER: SPHERE
OS_CYLINDER: SPHERE
OD_SPHERE: +2.50
OS_SPHERE: +2.50
SPECS_TYPE: OTC CHEATERS

## 2021-10-28 ASSESSMENT — REFRACTION_MANIFEST
OS_ADD: +2.50
OD_CYLINDER: +1.25
OD_SPHERE: -1.00
OS_CYLINDER: +1.50
OS_AXIS: 174
OD_AXIS: 176
METHOD_AUTOREFRACTION: 1
OD_CYLINDER: +1.50
OS_SPHERE: -1.25
OS_AXIS: 007
OD_SPHERE: -0.50
OD_AXIS: 180
OD_ADD: +2.50
OS_SPHERE: -0.25
OS_CYLINDER: +1.50

## 2021-10-28 ASSESSMENT — SLIT LAMP EXAM - LIDS
COMMENTS: NORMAL
COMMENTS: NORMAL

## 2021-10-28 ASSESSMENT — VISUAL ACUITY
OS_SC+: +3
CORRECTION_TYPE: GLASSES
OD_SC: 20/30
METHOD: SNELLEN - LINEAR
OS_SC: 20/30
OS_SC: 20/200
OS_CC: 20/40+3
OD_SC: 20/120
OD_CC: 20/20-1

## 2021-10-28 ASSESSMENT — CONF VISUAL FIELD
OS_NORMAL: 1
OD_NORMAL: 1
METHOD: COUNTING FINGERS

## 2021-10-28 ASSESSMENT — EXTERNAL EXAM - LEFT EYE: OS_EXAM: NORMAL

## 2021-10-28 ASSESSMENT — CUP TO DISC RATIO
OD_RATIO: 0.35
OS_RATIO: 0.3

## 2021-10-28 ASSESSMENT — EXTERNAL EXAM - RIGHT EYE: OD_EXAM: NORMAL

## 2021-10-28 ASSESSMENT — TONOMETRY
OD_IOP_MMHG: 18
IOP_METHOD: APPLANATION
OS_IOP_MMHG: 18

## 2021-10-28 NOTE — LETTER
10/28/2021         RE: Devi Patel  [address]  [place] [address]        Dear Colleague,    Thank you for referring your patient, Devi Patel, to the [facility]. Please see a copy of my visit note below.    Chief Complaint   Patient presents with     Diabetic Eye Exam        Chief Complaint(s) and History of Present Illness(es)     Diabetic Eye Exam     Diabetes Type: Type 2 and on insulin    Duration: 6 years               Hemoglobin A1C   Date Value Ref Range Status   08/30/2021 8.6 (H) 0.0 - 5.6 % Final     Comment:     Normal <5.7%   Prediabetes 5.7-6.4%    Diabetes 6.5% or higher     Note: Adopted from ADA consensus guidelines.   06/22/2020 8.7 (H) 0 - 5.6 % Final     Comment:     Results confirmed by repeat test  Normal <5.7% Prediabetes 5.7-6.4%  Diabetes 6.5% or higher - adopted from ADA   consensus guidelines.     04/25/2019 7.9 (H) 0 - 5.6 % Final     Comment:     Normal <5.7% Prediabetes 5.7-6.4%  Diabetes 6.5% or higher - adopted from ADA   consensus guidelines.     01/16/2018 8.4 (H) 4.3 - 6.0 % Final     Comment:     Reviewed: OK with previous       Last Eye Exam: 3 years  Dilated Previously: Yes, side effects of dilation explained today    What are you currently using to see? +2.50 readers    Distance Vision Acuity: Satisfied with vision - has some trouble driving at night    Near Vision Acuity: Not satisfied     Eye Comfort: good  Do you use eye drops? 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       OBJECTIVE: See Ophthalmology exam    ASSESSMENT:    ICD-10-CM    1. Encounter for examination of eyes and vision with abnormal findings  Z01.01    2. Type 2 diabetes mellitus without retinopathy (H)  E11.9    3. Nuclear age-related cataract, both eyes  H25.13    4. Myopia of both eyes  H52.13    5. Regular astigmatism of both eyes  H52.223    6. Presbyopia  H52.4        PLAN:    Devi Patel aware  eye exam results will be sent to Emanuel Mcclure  Patient Instructions   Patient educated on importance of good blood sugar control.  Letter sent to primary care provider with diabetic eye exam report.     You have the start of mild cataracts.  You may notice some blurred vision or glare with night driving.  It is important that you wear good sunglasses to protect your eyes from the ultraviolet light from the sun.     Devi was advised of today's exam findings.  Fill glasses prescription. Discussed bifocal lenses.  Allow 2 weeks to adapt to change in glasses  Wear glasses full time  Copy of glasses Rx provided today.    Return in 1 year for eye exam, or sooner if needed.    The effects of the dilating drops last for 4- 6 hours.  You will be more sensitive to light and vision will be blurry up close.  Mydriatic sunglasses were given if needed.    Zach Wen O.D.  [address] [place]  [address]    [phone]               Again, thank you for allowing me to participate in the care of your patient.        Sincerely,        Zach Wen, [place]

## 2021-10-28 NOTE — PROGRESS NOTES
Chief Complaint   Patient presents with     Diabetic Eye Exam        Chief Complaint(s) and History of Present Illness(es)     Diabetic Eye Exam     Diabetes Type: Type 2 and on insulin    Duration: 6 years               Hemoglobin A1C   Date Value Ref Range Status   08/30/2021 8.6 (H) 0.0 - 5.6 % Final     Comment:     Normal <5.7%   Prediabetes 5.7-6.4%    Diabetes 6.5% or higher     Note: Adopted from ADA consensus guidelines.   06/22/2020 8.7 (H) 0 - 5.6 % Final     Comment:     Results confirmed by repeat test  Normal <5.7% Prediabetes 5.7-6.4%  Diabetes 6.5% or higher - adopted from ADA   consensus guidelines.     04/25/2019 7.9 (H) 0 - 5.6 % Final     Comment:     Normal <5.7% Prediabetes 5.7-6.4%  Diabetes 6.5% or higher - adopted from ADA   consensus guidelines.     01/16/2018 8.4 (H) 4.3 - 6.0 % Final     Comment:     Reviewed: OK with previous       Last Eye Exam: 3 years  Dilated Previously: Yes, side effects of dilation explained today    What are you currently using to see? +2.50 readers    Distance Vision Acuity: Satisfied with vision - has some trouble driving at night    Near Vision Acuity: Not satisfied     Eye Comfort: good  Do you use eye drops? 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       OBJECTIVE: See Ophthalmology exam    ASSESSMENT:    ICD-10-CM    1. Encounter for examination of eyes and vision with abnormal findings  Z01.01    2. Type 2 diabetes mellitus without retinopathy (H)  E11.9    3. Nuclear age-related cataract, both eyes  H25.13    4. Myopia of both eyes  H52.13    5. Regular astigmatism of both eyes  H52.223    6. Presbyopia  H52.4       PLAN:    Devi hughes  eye exam results will be sent to Emanuel Mcclure  Patient Instructions   Patient educated on importance of good blood sugar control.  Letter sent to primary care provider with diabetic eye exam report.     You have the start  of mild cataracts.  You may notice some blurred vision or glare with night driving.  It is important that you wear good sunglasses to protect your eyes from the ultraviolet light from the sun.     Devi was advised of today's exam findings.  Fill glasses prescription. Discussed bifocal lenses.  Allow 2 weeks to adapt to change in glasses  Wear glasses full time  Copy of glasses Rx provided today.    Return in 1 year for eye exam, or sooner if needed.    The effects of the dilating drops last for 4- 6 hours.  You will be more sensitive to light and vision will be blurry up close.  Mydriatic sunglasses were given if needed.    Zach Wen O.D.  [address] [place]  [address]    [phone]

## 2021-10-28 NOTE — PATIENT INSTRUCTIONS
Patient educated on importance of good blood sugar control.  Letter sent to primary care provider with diabetic eye exam report.     You have the start of mild cataracts.  You may notice some blurred vision or glare with night driving.  It is important that you wear good sunglasses to protect your eyes from the ultraviolet light from the sun.     Devi was advised of today's exam findings.  Fill glasses prescription. Discussed bifocal lenses.  Allow 2 weeks to adapt to change in glasses  Wear glasses full time  Copy of glasses Rx provided today.    Return in 1 year for eye exam, or sooner if needed.    The effects of the dilating drops last for 4- 6 hours.  You will be more sensitive to light and vision will be blurry up close.  Mydriatic sunglasses were given if needed.    Zach Wen O.D.  [address] [place]  [address]    [phone]

## 2021-11-23 ENCOUNTER — APPOINTMENT (OUTPATIENT)
Dept: OPTOMETRY | Facility: CLINIC | Age: 61
End: 2021-11-23
Payer: COMMERCIAL

## 2021-11-23 PROCEDURE — 92341 FIT SPECTACLES BIFOCAL: CPT | Performed by: OPTOMETRIST

## 2022-01-28 ENCOUNTER — TELEPHONE (OUTPATIENT)
Dept: INTERNAL MEDICINE | Facility: CLINIC | Age: 62
End: 2022-01-28
Payer: COMMERCIAL

## 2022-01-28 NOTE — TELEPHONE ENCOUNTER
Patient is asking about returning to Metformin vs. Insulin. Phone visit scheduled with primary to discuss.     Kassi Nath RN  [facility]

## 2022-01-31 DIAGNOSIS — E11.9 TYPE 2 DIABETES MELLITUS WITHOUT COMPLICATION, WITH LONG-TERM CURRENT USE OF INSULIN (H): ICD-10-CM

## 2022-01-31 DIAGNOSIS — Z79.4 TYPE 2 DIABETES MELLITUS WITHOUT COMPLICATION, WITH LONG-TERM CURRENT USE OF INSULIN (H): ICD-10-CM

## 2022-01-31 NOTE — TELEPHONE ENCOUNTER
Patient is requesting to restart Metformin has F2F scheduled for 02/07/2022 at 9am.  Patient states Provider discontinued medication due to Diarrhea, patient stated she made some changes to her diet to eliminate dairy and eggs, which helped with the diarrhea.   Patient stated that without the Metformin and using Humalog 45-50 units B/S were elevated. However, with the Metformin B/S was stable (Patient hah some left over).  Order Pending for review-

## 2022-02-01 NOTE — TELEPHONE ENCOUNTER
Restart Metformin 1000 mg twice daily for now.  Will discuss with patient further at upcoming appointment prescription written for 30 days with additional refills.  Follow-up with me as scheduled. Check blood sugars four times a day (before meals and bedtime) for the 4 days prior to the appointment with me,  write them down and have them available to review with the appointment

## 2022-02-02 NOTE — TELEPHONE ENCOUNTER
LM for patient to call back and discuss patients question regarding her Metformin.     Jennifer Montemayor MA

## 2022-02-07 ENCOUNTER — OFFICE VISIT (OUTPATIENT)
Dept: INTERNAL MEDICINE | Facility: CLINIC | Age: 62
End: 2022-02-07
Payer: COMMERCIAL

## 2022-02-07 VITALS
OXYGEN SATURATION: 98 % | TEMPERATURE: 97.5 F | BODY MASS INDEX: 34.37 KG/M2 | RESPIRATION RATE: 16 BRPM | HEART RATE: 89 BPM | SYSTOLIC BLOOD PRESSURE: 132 MMHG | DIASTOLIC BLOOD PRESSURE: 80 MMHG | WEIGHT: 194 LBS

## 2022-02-07 DIAGNOSIS — E11.9 TYPE 2 DIABETES MELLITUS WITHOUT COMPLICATION, WITH LONG-TERM CURRENT USE OF INSULIN (H): ICD-10-CM

## 2022-02-07 DIAGNOSIS — E03.9 HYPOTHYROIDISM, UNSPECIFIED TYPE: ICD-10-CM

## 2022-02-07 DIAGNOSIS — Z12.31 ENCOUNTER FOR SCREENING MAMMOGRAM FOR BREAST CANCER: ICD-10-CM

## 2022-02-07 DIAGNOSIS — Z23 HIGH PRIORITY FOR 2019-NCOV VACCINE: ICD-10-CM

## 2022-02-07 DIAGNOSIS — Z79.4 TYPE 2 DIABETES MELLITUS WITHOUT COMPLICATION, WITH LONG-TERM CURRENT USE OF INSULIN (H): ICD-10-CM

## 2022-02-07 LAB
ANION GAP SERPL CALCULATED.3IONS-SCNC: 3 MMOL/L (ref 3–14)
BUN SERPL-MCNC: 14 MG/DL (ref 7–30)
CALCIUM SERPL-MCNC: 8.8 MG/DL (ref 8.5–10.1)
CHLORIDE BLD-SCNC: 110 MMOL/L (ref 94–109)
CO2 SERPL-SCNC: 28 MMOL/L (ref 20–32)
CREAT SERPL-MCNC: 1 MG/DL (ref 0.52–1.04)
GFR SERPL CREATININE-BSD FRML MDRD: 63 ML/MIN/1.73M2
GLUCOSE BLD-MCNC: 175 MG/DL (ref 70–99)
HBA1C MFR BLD: 8.5 % (ref 0–5.6)
POTASSIUM BLD-SCNC: 4 MMOL/L (ref 3.4–5.3)
SODIUM SERPL-SCNC: 141 MMOL/L (ref 133–144)
TSH SERPL DL<=0.005 MIU/L-ACNC: 3.05 MU/L (ref 0.4–4)

## 2022-02-07 PROCEDURE — 99214 OFFICE O/P EST MOD 30 MIN: CPT | Performed by: INTERNAL MEDICINE

## 2022-02-07 PROCEDURE — 36415 COLL VENOUS BLD VENIPUNCTURE: CPT | Performed by: INTERNAL MEDICINE

## 2022-02-07 PROCEDURE — 80048 BASIC METABOLIC PNL TOTAL CA: CPT | Performed by: INTERNAL MEDICINE

## 2022-02-07 PROCEDURE — 83036 HEMOGLOBIN GLYCOSYLATED A1C: CPT | Performed by: INTERNAL MEDICINE

## 2022-02-07 PROCEDURE — 84443 ASSAY THYROID STIM HORMONE: CPT | Performed by: INTERNAL MEDICINE

## 2022-02-07 RX ORDER — DAPAGLIFLOZIN 5 MG/1
5 TABLET, FILM COATED ORAL DAILY
Qty: 30 TABLET | Refills: 11 | Status: SHIPPED | OUTPATIENT
Start: 2022-02-07 | End: 2023-10-16

## 2022-02-07 RX ORDER — INSULIN GLARGINE 100 [IU]/ML
INJECTION, SOLUTION SUBCUTANEOUS
Qty: 45 ML | Refills: 3
Start: 2022-02-07 | End: 2023-10-16

## 2022-02-07 ASSESSMENT — PATIENT HEALTH QUESTIONNAIRE - PHQ9
SUM OF ALL RESPONSES TO PHQ QUESTIONS 1-9: 2
10. IF YOU CHECKED OFF ANY PROBLEMS, HOW DIFFICULT HAVE THESE PROBLEMS MADE IT FOR YOU TO DO YOUR WORK, TAKE CARE OF THINGS AT HOME, OR GET ALONG WITH OTHER PEOPLE: NOT DIFFICULT AT ALL
SUM OF ALL RESPONSES TO PHQ QUESTIONS 1-9: 2

## 2022-02-07 NOTE — PROGRESS NOTES
ASSESSMENT:   1. Type 2 diabetes mellitus without complication, with long-term current use of insulin (H)  Uncontrolled.  We will restart metformin and also add Farxiga.  We will temporarily reduce Lantus to 30 units at bedtime with restart of oral medications and have patient check blood sugars in 2 weeks and update me with results.  We will also recheck kidney function in 2 weeks with Farxiga use to be sure patient not having dehydration.  Depending on blood sugars at that time, we will titrate up metformin to Farxiga doses further and potentially increase back Lantus dosage  - dapagliflozin (FARXIGA) 5 MG TABS tablet; Take 1 tablet (5 mg) by mouth daily  Dispense: 30 tablet; Refill: 11  - insulin glargine (LANTUS SOLOSTAR) 100 UNIT/ML pen; 30 units at bedtime  Dispense: 45 mL; Refill: 3  - Hemoglobin A1c; Future  - Basic metabolic panel; Future  - metFORMIN (GLUCOPHAGE) 1000 MG tablet; TAKE ONE TABLET BY MOUTH TWICE A DAY WITH MEALS  Dispense: 60 tablet; Refill: 11  - Hemoglobin A1c  - Basic metabolic panel    2. Hypothyroidism, unspecified type  TSH minimally elevated with previous lab.  Needs lab recheck.  Weight increased  - TSH with free T4 reflex; Future  - TSH with free T4 reflex    3. High priority for 2019-nCoV vaccine  Candidate for COVID booster  - COVID-19,PF,MODERNA (18+ Yrs BOOSTER .25mL)    4. Encounter for screening mammogram for breast cancer  Due for breast cancer screening  - *MA Screening Digital Bilateral; Future      PLAN:  Restart metformin 1000mg tab, 1/2 tab twice a day for 1 week. Then increase to 1 tab twice a day for diabetes   Farxiga 5mg tab, 1 tab daily in AM for diabetes  Reduce Lantus to 30 units daily at bedtime for now  Only use Humalog insulin if eating meals  Thyroid and  diabetes labs today   Nonfasting kidney lab in 2 weeks. Call [facility] to schedule lab appt  Have an extra glass or two of water per day  Mammogram at [facility]. May be done at  [facility]. Call for appt  In 2 weeks, then check blood sugars twice a day (before breakfast and bedtime) for 5 days and call results to the nurseline [phone]         (Chart documentation was completed, in part, with [employer] voice-recognition software. Even though reviewed, some grammatical, spelling, and word errors may remain.)    Emanuel Mcclure MD  Internal Medicine Department  [facility] DOE Quinonez is a 62 year old who presents for the following health issues accompanied by son     History of Present Illness       Diabetes:   She presents for follow up of diabetes.  She is checking home blood glucose four or more times daily. She checks blood glucose before and after meals and at bedtime.  Blood glucose is sometimes over 200 and never under 70. She is aware of hypoglycemia symptoms including shakiness, dizziness and blurred vision. She is concerned about blood sugar frequently over 200. She is having blurry vision.            BP Readings from Last 2 Encounters:   09/03/21 126/74   07/06/20 136/78     Hemoglobin A1C POCT (%)   Date Value   06/22/2020 8.7 (H)   04/25/2019 7.9 (H)     Hemoglobin A1C (%)   Date Value   08/30/2021 8.6 (H)     LDL Cholesterol Calculated (mg/dL)   Date Value   08/30/2021 80   06/22/2020 66   04/25/2019 66               Hypertension Follow-up      Do you check your blood pressure regularly outside of the clinic? No     Are you following a low salt diet? Yes    Are your blood pressures ever more than 140 on the top number (systolic) OR more   than 90 on the bottom number (diastolic), for example 140/90? N/A     Most recent lab results reviewed with pt.       Component      Latest Ref Rng & Units 6/22/2020 8/30/2021   Sodium      133 - 144 mmol/L  136   Potassium      3.4 - 5.3 mmol/L  4.1   Chloride      94 - 109 mmol/L  104   Carbon Dioxide      20 - 32 mmol/L  27   Anion Gap      3 - 14 mmol/L  5   Urea Nitrogen      7 - 30 mg/dL  18    Creatinine      0.52 - 1.04 mg/dL  1.04   Calcium      8.5 - 10.1 mg/dL  8.9   Glucose      70 - 99 mg/dL  184 (H)   Alkaline Phosphatase      40 - 150 U/L  110   AST      0 - 45 U/L  16   ALT      0 - 50 U/L  25   Protein Total      6.8 - 8.8 g/dL  7.6   Albumin      3.4 - 5.0 g/dL  3.6   Bilirubin Total      0.2 - 1.3 mg/dL  0.4   GFR Estimate      >60 mL/min/1.73m2  58 (L)   Cholesterol      <200 mg/dL  159   Triglycerides      <150 mg/dL  202 (H)   HDL Cholesterol      >=50 mg/dL  39 (L)   LDL Cholesterol Calculated      <=100 mg/dL  80   Non HDL Cholesterol      <130 mg/dL  120   Patient Fasting > 8hrs?        Yes   Creatinine Urine      mg/dL  255   Albumin Urine mg/L      mg/L  51   Albumin Urine mg/g Cr      0.00 - 25.00 mg/g Cr  20.00   Hemoglobin A1C POCT      0 - 5.6 % 8.7 (H)    Hemoglobin A1C      0.0 - 5.6 %  8.6 (H)   TSH      0.40 - 4.00 mU/L  5.10 (H)   T4 Free      0.76 - 1.46 ng/dL  0.93         Patient last seen in September.  Was having GI issues at that time.  Metformin was discontinued.  Was referred on to diabetes education for adjustment in insulin dosage but patient never scheduled the appointment.  Patient had made some adjustments to her diet last month.  Requested to restart metformin which she is taking 1000 mg twice daily now.   Bowel movements okay currently.    Weight up another 8 pounds since last visit.  Rare blurry vision. OK currently. Eye exam October. Has bifocals  Eats 1-2x/day   Working as a PCA with stress often  Denies chest pain, shortness of breath, abdominal pain, numbness in the extremities     Sugars (not matching particular order in days)  AM: 86, 176, 204  Lunch 226, 217  Supper 281, 306  Bed: 204, 138      Additional ROS:   Constitutional, HEENT, Cardiovascular, Pulmonary, GI and , Neuro, MSK and Psych review of systems/symptoms are otherwise negative or unchanged from previous, except as noted above.      OBJECTIVE:  /80   Pulse 89   Temp 97.5  F (36.4  " C) (Temporal)   Resp 16   Wt 88 kg (194 lb)   LMP 07/10/2009   SpO2 98%   BMI 34.37 kg/m     Estimated body mass index is 34.37 kg/m  as calculated from the following:    Height as of 7/6/20: 1.6 m (5' 3\").    Weight as of this encounter: 88 kg (194 lb).     Neck: no adenopathy. Thyroid normal to palpation. No bruits  Pulm: Lungs clear to auscultation   CV: Regular rates and rhythm  GI: Soft, obese, nontender, Normal active bowel sounds, No hepatosplenomegaly or masses palpable  Ext: Peripheral pulses intact. No edema.  Neuro: Normal strength and tone, sensory exam grossly normal                "

## 2022-02-07 NOTE — PATIENT INSTRUCTIONS
Restart metformin 1000mg tab, 1/2 tab twice a day for 1 week. Then increase to 1 tab twice a day for diabetes   Farxiga 5mg tab, 1 tab daily in AM for diabetes  Reduce Lantus to 30 units daily at bedtime for now  Only use Humalog insulin if eating meals  Thyroid and  diabetes labs today   Nonfasting kidney lab in 2 weeks. Call [facility] to schedule lab appt  Have an extra glass or two of water per day  Mammogram at [facility]. May be done at [facility]. Call for appt  In 2 weeks, then check blood sugars twice a day (before breakfast and bedtime) for 5 days and call results to the nurseline [phone]

## 2022-02-08 ASSESSMENT — PATIENT HEALTH QUESTIONNAIRE - PHQ9: SUM OF ALL RESPONSES TO PHQ QUESTIONS 1-9: 2

## 2022-02-09 DIAGNOSIS — E11.9 TYPE 2 DIABETES MELLITUS WITHOUT COMPLICATION, WITH LONG-TERM CURRENT USE OF INSULIN (H): ICD-10-CM

## 2022-02-09 DIAGNOSIS — Z79.4 TYPE 2 DIABETES MELLITUS WITHOUT COMPLICATION, WITH LONG-TERM CURRENT USE OF INSULIN (H): ICD-10-CM

## 2022-02-11 RX ORDER — LANCETS
EACH MISCELLANEOUS
Qty: 300 EACH | Refills: 3 | OUTPATIENT
Start: 2022-02-11

## 2022-02-17 PROBLEM — K21.9 GASTROESOPHAGEAL REFLUX DISEASE: Status: ACTIVE | Noted: 2020-07-12

## 2022-09-26 DIAGNOSIS — R80.9 PROTEINURIA, UNSPECIFIED TYPE: ICD-10-CM

## 2022-09-26 DIAGNOSIS — I10 ESSENTIAL HYPERTENSION: ICD-10-CM

## 2022-09-28 RX ORDER — LOSARTAN POTASSIUM 100 MG/1
TABLET ORAL
Qty: 90 TABLET | Refills: 0 | Status: SHIPPED | OUTPATIENT
Start: 2022-09-28 | End: 2023-01-04

## 2022-09-28 NOTE — TELEPHONE ENCOUNTER
Prescription approved per [facility] Refill Protocol.  Jamar Houser RN  [facility] Triage Nurse

## 2022-11-21 DIAGNOSIS — K21.9 GASTROESOPHAGEAL REFLUX DISEASE WITHOUT ESOPHAGITIS: ICD-10-CM

## 2022-11-21 DIAGNOSIS — E11.9 TYPE 2 DIABETES MELLITUS WITHOUT COMPLICATION, WITH LONG-TERM CURRENT USE OF INSULIN (H): Primary | ICD-10-CM

## 2022-11-21 DIAGNOSIS — E03.9 HYPOTHYROIDISM, UNSPECIFIED TYPE: ICD-10-CM

## 2022-11-21 DIAGNOSIS — E78.5 HYPERLIPIDEMIA WITH TARGET LDL LESS THAN 100: ICD-10-CM

## 2022-11-21 DIAGNOSIS — Z79.4 TYPE 2 DIABETES MELLITUS WITHOUT COMPLICATION, WITH LONG-TERM CURRENT USE OF INSULIN (H): Primary | ICD-10-CM

## 2022-11-21 NOTE — LETTER
[facility]  [address] [address]  [phone]            Devi [address]  [facility] [address]        Dear Devi,    While refilling your prescription today, we noticed that you are overdue for a follow up appointment and fasting labs with Dr. Mcclure.  Please call to schedule to a fasting lab appointment with along with a separate follow up appointment with Dr. Mcclure a few days after labs are complete to review those results and address other medical issues as needed. Your prescriptions have been filled for a 90 day supply, but further refills will be address at your office visit.       Check blood sugars four times a day (before meals and bedtime) for the 4 days prior to the appointment with me,  write them down and have them available to review with the appointment.     Taking care of your health is important to us and we look forward to seeing you in the near future.  Please call us at [phone] or [phone] (or use [employer]) to schedule an appointment.      Please disregard this notice if you have already made an appointment.           [facility]

## 2022-11-23 NOTE — TELEPHONE ENCOUNTER
Routing refill request to provider for review/approval because:  LDL Cholesterol Calculated   Date Value Ref Range Status   08/30/2021 80 <=100 mg/dL Final     Comment:     Age 0-19 years:  Desirable: 0-110 mg/dL   Borderline high: 110-129 mg/dL   High: >= 130 mg/dL    Age 20 years and older:  Desirable: <100mg/dL  Above desirable: 100-129 mg/dL   Borderline high: 130-159 mg/dL   High: 160-189 mg/dL   Very high: >= 190 mg/dL   06/22/2020 66 <100 mg/dL Final     Comment:     Desirable:       <100 mg/dl     please route to team to contact patient for appointment       Schuyler Diaz RN  [facility] Triage Nurse

## 2022-12-04 RX ORDER — ATORVASTATIN CALCIUM 10 MG/1
TABLET, FILM COATED ORAL
Qty: 90 TABLET | Refills: 0 | Status: SHIPPED | OUTPATIENT
Start: 2022-12-04 | End: 2024-07-15

## 2022-12-05 NOTE — TELEPHONE ENCOUNTER
Patient due for fasting  Labs for  follow-up regarding diabetes and other medical issues along with MD appointment in clinic to review blood sugars, blood pressure, etc..   Atorvastatin refilled 90 days.  Call patient and assist in scheduling a fasting lab appointment followed by an appointment with me in clinic a few days later.  May use same-day or next day appointment slot.  Also instruct patient to check blood sugars four times a day (before meals and bedtime) for the 4 days prior to the appointment with me,  write them down and have them available to review with the appointment

## 2023-02-04 DIAGNOSIS — Z79.4 TYPE 2 DIABETES MELLITUS WITHOUT COMPLICATION, WITH LONG-TERM CURRENT USE OF INSULIN (H): ICD-10-CM

## 2023-02-04 DIAGNOSIS — K21.9 GASTROESOPHAGEAL REFLUX DISEASE WITHOUT ESOPHAGITIS: ICD-10-CM

## 2023-02-04 DIAGNOSIS — E11.9 TYPE 2 DIABETES MELLITUS WITHOUT COMPLICATION, WITH LONG-TERM CURRENT USE OF INSULIN (H): ICD-10-CM

## 2023-02-06 ENCOUNTER — TRANSFERRED RECORDS (OUTPATIENT)
Dept: MULTI SPECIALTY CLINIC | Facility: CLINIC | Age: 63
End: 2023-02-06

## 2023-02-06 LAB
CHOLESTEROL (EXTERNAL): 123 MG/DL
CREATININE (EXTERNAL): 1.12 MG/DL (ref 0.55–1.02)
GFR ESTIMATED (EXTERNAL): 52 ML/MIN/1.73M2
GLUCOSE (EXTERNAL): 208 MG/DL (ref 70–100)
HBA1C MFR BLD: 10.6 % (ref 3.8–5.6)
HDLC SERPL-MCNC: 45 MG/DL (ref 40–60)
HEP C HIM: NORMAL
HIV 1&2 EXT: NORMAL
LDL CHOLESTEROL CALCULATED (EXTERNAL): 53 MG/DL
NON HDL CHOLESTEROL (EXTERNAL): 78 MG/DL
POTASSIUM (EXTERNAL): 4.2 MMOL/L (ref 3.5–5.1)
TRIGLYCERIDES (EXTERNAL): 125 MG/DL

## 2023-02-08 NOTE — TELEPHONE ENCOUNTER
Patient has seen a year ago.  Was sent letters in November 2022 instructing her to schedule follow-up appointment with me in the next 3 months but no appointments ever made.  Also overdue for fasting blood and urine labs.  Call patient and get an appointment scheduled with me in the next month preceded by a fasting lab appointment in the week prior so lab results are available to review.  Also instruct patient Check blood sugars four times a day (before meals and bedtime) for the 4 days prior to the appointment with me,  write them down and have them available to review with the appointment.  After appointments are scheduled, then may route refill request back to me to address since patient has not followed up with previous instructions

## 2023-10-16 ENCOUNTER — OFFICE VISIT (OUTPATIENT)
Dept: INTERNAL MEDICINE | Facility: CLINIC | Age: 63
End: 2023-10-16
Payer: COMMERCIAL

## 2023-10-16 VITALS
SYSTOLIC BLOOD PRESSURE: 154 MMHG | HEIGHT: 63 IN | BODY MASS INDEX: 33.01 KG/M2 | TEMPERATURE: 98.4 F | DIASTOLIC BLOOD PRESSURE: 98 MMHG | HEART RATE: 96 BPM | WEIGHT: 186.3 LBS | OXYGEN SATURATION: 96 %

## 2023-10-16 DIAGNOSIS — G89.29 CHRONIC NONINTRACTABLE HEADACHE, UNSPECIFIED HEADACHE TYPE: ICD-10-CM

## 2023-10-16 DIAGNOSIS — R51.9 CHRONIC NONINTRACTABLE HEADACHE, UNSPECIFIED HEADACHE TYPE: ICD-10-CM

## 2023-10-16 DIAGNOSIS — E78.5 HYPERLIPIDEMIA WITH TARGET LDL LESS THAN 100: ICD-10-CM

## 2023-10-16 DIAGNOSIS — Z12.31 VISIT FOR SCREENING MAMMOGRAM: ICD-10-CM

## 2023-10-16 DIAGNOSIS — Z79.4 TYPE 2 DIABETES MELLITUS WITHOUT COMPLICATION, WITH LONG-TERM CURRENT USE OF INSULIN (H): ICD-10-CM

## 2023-10-16 DIAGNOSIS — E11.9 TYPE 2 DIABETES MELLITUS WITHOUT COMPLICATION, WITH LONG-TERM CURRENT USE OF INSULIN (H): ICD-10-CM

## 2023-10-16 DIAGNOSIS — R10.13 EPIGASTRIC PAIN: ICD-10-CM

## 2023-10-16 DIAGNOSIS — I10 HYPERTENSION, UNSPECIFIED TYPE: ICD-10-CM

## 2023-10-16 LAB
ALBUMIN SERPL BCG-MCNC: 4.4 G/DL (ref 3.5–5.2)
ALP SERPL-CCNC: 119 U/L (ref 35–104)
ALT SERPL W P-5'-P-CCNC: 15 U/L (ref 0–50)
ANION GAP SERPL CALCULATED.3IONS-SCNC: 11 MMOL/L (ref 7–15)
AST SERPL W P-5'-P-CCNC: 19 U/L (ref 0–45)
BILIRUB SERPL-MCNC: 0.4 MG/DL
BUN SERPL-MCNC: 13 MG/DL (ref 8–23)
CALCIUM SERPL-MCNC: 9.5 MG/DL (ref 8.8–10.2)
CHLORIDE SERPL-SCNC: 105 MMOL/L (ref 98–107)
CHOLEST SERPL-MCNC: 110 MG/DL
CREAT SERPL-MCNC: 1.08 MG/DL (ref 0.51–0.95)
CREAT UR-MCNC: 235 MG/DL
DEPRECATED HCO3 PLAS-SCNC: 25 MMOL/L (ref 22–29)
EGFRCR SERPLBLD CKD-EPI 2021: 57 ML/MIN/1.73M2
GLUCOSE SERPL-MCNC: 161 MG/DL (ref 70–99)
HBA1C MFR BLD: 8.4 % (ref 0–5.6)
HDLC SERPL-MCNC: 40 MG/DL
LDLC SERPL CALC-MCNC: 49 MG/DL
MICROALBUMIN UR-MCNC: 170 MG/L
MICROALBUMIN/CREAT UR: 72.34 MG/G CR (ref 0–25)
NONHDLC SERPL-MCNC: 70 MG/DL
POTASSIUM SERPL-SCNC: 4.2 MMOL/L (ref 3.4–5.3)
PROT SERPL-MCNC: 7.7 G/DL (ref 6.4–8.3)
SODIUM SERPL-SCNC: 141 MMOL/L (ref 135–145)
TRIGL SERPL-MCNC: 107 MG/DL
TSH SERPL DL<=0.005 MIU/L-ACNC: 3.49 UIU/ML (ref 0.3–4.2)

## 2023-10-16 PROCEDURE — 82043 UR ALBUMIN QUANTITATIVE: CPT | Performed by: INTERNAL MEDICINE

## 2023-10-16 PROCEDURE — 80061 LIPID PANEL: CPT | Performed by: INTERNAL MEDICINE

## 2023-10-16 PROCEDURE — 84443 ASSAY THYROID STIM HORMONE: CPT | Performed by: INTERNAL MEDICINE

## 2023-10-16 PROCEDURE — 83036 HEMOGLOBIN GLYCOSYLATED A1C: CPT | Performed by: INTERNAL MEDICINE

## 2023-10-16 PROCEDURE — 82570 ASSAY OF URINE CREATININE: CPT | Performed by: INTERNAL MEDICINE

## 2023-10-16 PROCEDURE — 99214 OFFICE O/P EST MOD 30 MIN: CPT | Performed by: INTERNAL MEDICINE

## 2023-10-16 PROCEDURE — 80053 COMPREHEN METABOLIC PANEL: CPT | Performed by: INTERNAL MEDICINE

## 2023-10-16 PROCEDURE — 36415 COLL VENOUS BLD VENIPUNCTURE: CPT | Performed by: INTERNAL MEDICINE

## 2023-10-16 RX ORDER — GABAPENTIN 100 MG/1
100 CAPSULE ORAL 3 TIMES DAILY
Qty: 60 CAPSULE | Refills: 11 | Status: SHIPPED | OUTPATIENT
Start: 2023-10-16 | End: 2024-07-25

## 2023-10-16 RX ORDER — AMLODIPINE BESYLATE 5 MG/1
5 TABLET ORAL DAILY
Qty: 30 TABLET | Refills: 11 | Status: SHIPPED | OUTPATIENT
Start: 2023-10-16 | End: 2024-07-15 | Stop reason: SINTOL

## 2023-10-16 RX ORDER — BLOOD-GLUCOSE SENSOR
EACH MISCELLANEOUS
COMMUNITY
Start: 2023-10-10 | End: 2024-03-20

## 2023-10-16 RX ORDER — INSULIN GLARGINE 100 [IU]/ML
INJECTION, SOLUTION SUBCUTANEOUS
Start: 2023-10-16 | End: 2024-08-21

## 2023-10-16 ASSESSMENT — PATIENT HEALTH QUESTIONNAIRE - PHQ9
SUM OF ALL RESPONSES TO PHQ QUESTIONS 1-9: 10
10. IF YOU CHECKED OFF ANY PROBLEMS, HOW DIFFICULT HAVE THESE PROBLEMS MADE IT FOR YOU TO DO YOUR WORK, TAKE CARE OF THINGS AT HOME, OR GET ALONG WITH OTHER PEOPLE: NOT DIFFICULT AT ALL
SUM OF ALL RESPONSES TO PHQ QUESTIONS 1-9: 10

## 2023-10-16 NOTE — COMMUNITY RESOURCES LIST (PATIENT PREFERRED LANGUAGE)
10/16/2023   [facility]  N/A  Si tiene preguntas sobre esta lista de recursos o necesidades de atención adicionales, comuníquese con pate clínica de atención primaria o administrador de atención.  Phone: [phone]   Email: N/A   Address: [address] [place] [address]   Hours: N/A        Estabilidad financiera       Asistencia para el pago de alquiler e hipoteca  1  [place] [facility] Servicios Humanos y Jaz Pública - [facility] Distancia: 1.09 millas      En persona, Teléfono/Virtual   [address] [place] [address]  Idioma: Niru Wasserman: josee sánchez 8:00 - 16:30  Honorarios: Mallory   Phone: [phone] Email: [email or web address] Website: https://www.ZOGOtennis/[facility]/facilities/service-center-info     2  [place] [facility] - Asistencia de alquiler Distancia: 2.42 millas      En persona   [address] [place] [address]  Idioma: Niru Wasserman: josee sánchez 9:00 - 17:00  Honorarios: Mallory   Phone: [phone] Email: info@Red Balloon [employer].Smart Devices Website: http://www.benita.org/     Asistencia para el pago de servicios públicos  3  [place] - [facility] Servicios Humanos y Jaz Pública - [facility] - Asistencia para la solicitud del Programa de Asistencia de Energía para Personas de Bajos Ingresos (LIHEAP) Distancia: 1.09 millas      En persona, Teléfono/Virtual   [address] [place] [address]  Idioma: Niru Wasserman: josee sánchez 8:00 - 16:30  Honorarios: Mallory   Phone: [phone] Email: [email or web address] Website: https://www.[place]./your-government/facilities/service-center-info     4  Energía de punto central - ???????Programa de asequibilidad del gas Distancia: 2.44 millas      Teléfono/Virtual   [address] [address] [place] [address]  Idioma: Niru Wasserman: josee Moran 24 horas  Honorarios: Jeremy valle    Phone: [phone] Website: http://www.BioSignia/en-us/[facility]/customer-service/billing-payment/need-help-paying-your-bill?sa=[place]          Comida y nutrición       Despensa de alimentos  5  Hussain en el Wong [facility] Distancia: 0.34 millas      Levantar   [address] [place] [address]  Idioma: Niru Wasserman: parth 17:00 - 19:00  Honorarios: Mallory   Phone: [phone] Email: info@Regroup Therapy Website: http://www.ITelagen.Boardwalktech/Our-Programs/Feeding-the-Future/Food-in-the-Wong     6  [facility] - Sorteo de comida comunitaria Distancia: 0.57 millas      En persona   [address] [place] [address]  Idioma: Niru Fletcher  Horas: miconchitaoles - viernes 10:00 - 14:00  Honorarios: Mallory   Phone: [phone] Email: info@Payoff Website: http://www.Payoff/     Asistencia para la solicitud de SNAP  7  [facility] de Servicios Humanos y Jaz Pública [facility] Distancia: 1.09 millas      En persona, Teléfono/Virtual   [address] [place] [address]  Idioma: Niru Wasserman: lunes - viernes 8:00 - 16:30  Honorarios: Mallory   Phone: [phone] Email: [email or web address] [email or web address]. Website: https://www.[facility]./your-[facility]/facilities/service-center-info     8  Centro de asistencia a [facility] ([facility]) Distancia: 1.78 millas      En persona, Teléfono/Virtual   [address] [place] [address]  Idioma: haider Marrufo tailandés  Horas: josee sánchez 9:00 - 17:00  Honorarios: Mallory   Phone: [phone] Email: [email or web address] Website: https://www.Horse Collaborative.com/NERI.org/     Comedor social o comidas mallory  9 [place] y Recreación de [facility] de [facility] - [place] comunitaria Distancia: 0.65 millas      En persona, Levantar   [address] [address]  Idioma:  Niru Wasserman: josee Sandra sánchez 15:00 - 21:00 , sábado 9:00 - 16:00  Honorarios: Mallory   Phone: [phone] Email: miesha@[facility] Bledsoe Hospitalks.[facility] Website: https://www.[facility]Acetec Semiconductor/parks__destinations/recreation_centers/[facility]_recreation_center/     10  Jeff Walters [facility] [place] - panes y peces Distancia: 0.65 millas      En persona   [address] [place] [address]  Idioma: Niru Wasserman: josee Sandra sánchez 12:30 - 13:30  Honorarios: Mallory   Phone: [phone] Email: [email or web address] Website: https://[email or web address]/          Números y sitios web importantes       Servicios de emergencia   911  Servicios de la ciudad   311  Control de envenenamiento   [phone]  Línea vital de prevención del suicidio   [phone] (TALK)  Línea directa contra el abuso de niños   [phone] (4-A-Child)  Línea directa contra el abuso sexual   [phone] (HOPE)  Línea directa nacional de emergencia para fugitivos   [phone] (RUNAWAY)  Línea de ayuda para la melo embarazada   [phone]  Derivación para el tratamiento por abuso de sustancias   [phone] (HELP)

## 2023-10-16 NOTE — PATIENT INSTRUCTIONS
Amlodipine 5mg daily for blood pressure   Mamm[facility])    Stop using Excedrin   Start Gabapentin 100mg capsule, 1 capsule  twice a day for headaches and prevention   Increase Lantus to 54 units daily at bedtime. If AM sugars remains > 130 after 4 days,then increase by 1 unit every other day until AM < 1230 and then stay at that dose   Take extra 2 units Humalog with  lunchtime meal. If sugars often > 200 2 hours after lunch , then increase another 1-2 units ai lunch baseline    Labs as ordered including H pylori stool test. Return stool sample to the nearest [facility] lab   Call update re: blood pressure and sugars in 2 weeks. Check blood sugars four times a day (before meals and bedtime) for the 4 days prior to the appointment with me,  write them down and have them available to review with the appointment

## 2023-10-16 NOTE — PROGRESS NOTES
ASSESSMENT:    1. Type 2 diabetes mellitus without complication, with long-term current use of insulin (H)  Uncontrolled based on blood sugars per CGM monitor.  A1C Feb 2022 elevated.  No recent A1c to review.  Labs today as ordered.  Will increase Lantus to 54 units and then titrate upward as needed per plan discussion below.  Patient to also take an extra 2 units of Humalog with lunch.  - Hemoglobin A1c; Future  - Comprehensive metabolic panel; Future  - Albumin Random Urine Quantitative with Creat Ratio; Future  - TSH with free T4 reflex; Future  - insulin glargine (LANTUS SOLOSTAR) 100 UNIT/ML pen; 55-60 units at bedtime  - Hemoglobin A1c  - Comprehensive metabolic panel  - Albumin Random Urine Quantitative with Creat Ratio  - TSH with free T4 reflex    2. Hypertension, unspecified type  Uncontrolled.  Start amlodipine 5 mg daily.  Discontinue Excedrin use  - amLODIPine (NORVASC) 5 MG tablet; Take 1 tablet (5 mg) by mouth daily  Dispense: 30 tablet; Refill: 11    3. Chronic nonintractable headache, unspecified headache type  Likely rebound headache issue from Excedrin use and work stress.  Start gabapentin.  Discontinue Excedrin.  Patient declines mental health counseling for work stress  - gabapentin (NEURONTIN) 100 MG capsule; Take 1 capsule (100 mg) by mouth 3 times daily  Dispense: 60 capsule; Refill: 11    4. Hyperlipidemia with target LDL less than 100  On atorvastatin 10 mg daily.  Needs lab follow-up  - Comprehensive metabolic panel; Future  - Lipid panel reflex to direct LDL Non-fasting; Future  - Comprehensive metabolic panel  - Lipid panel reflex to direct LDL Non-fasting    5. Epigastric pain  Intermittent epigastric pain.  Possibly related to Excedrin.  Taking aspirin 81 mg daily for heart protection but denies other NSAIDs use.  Denies any blood in stools.  Rule out H. pylori infection  - Helicobacter pylori Antigen Stool; Future  - Helicobacter pylori Antigen Stool    6. Visit for screening  mammogram  Due for breast cancer screening.  No symptoms  - MA SCREENING DIGITAL BILAT - Future  (s+30); Future      PLAN:  Amlodipine 5mg daily for blood pressure   Mamm[facility] at [facility] ([facility] or other)    Stop using Excedrin   Start Gabapentin 100mg capsule, 1 capsule  twice a day for headaches and prevention   Increase Lantus to 54 units daily at bedtime. If AM sugars remains > 130 after 4 days,then increase by 1 unit every other day until AM < 1230 and then stay at that dose   Take extra 2 units Humalog with  lunchtime meal. If sugars often > 200 2 hours after lunch , then increase another 1-2 units ai lunch baseline    Labs as ordered including H pylori stool test. Return stool sample to the nearest [facility] lab   Call update re: blood pressure and sugars in 2 weeks. Check blood sugars four times a day (before meals and bedtime) for the 4 days prior to the appointment with me,  write them down and have them available to review with the appointment       (Chart documentation was completed, in part, with Ziftit voice-recognition software. Even though reviewed, some grammatical, spelling, and word errors may remain.)    Emanuel Mcclure MD  Internal Medicine Department  [facility] KIRSTYA[facility] CampusPATTI Quinonez is a 63 year old, presenting for the following health issues:  Diabetes and Hypertension      History of Present Illness       Diabetes:   She presents for follow up of diabetes.   She is checking home blood glucose with a continuous glucose monitor.   She checks blood glucose before meals, after meals, before and after meals and at bedtime.  Blood glucose is sometimes over 200 and sometimes under 70. She is aware of hypoglycemia symptoms including shakiness, dizziness, weakness, blurred vision and confusion.   She is concerned about blood sugar frequently over 200.   She is having numbness in feet, blurry vision and weight gain.  The patient has had a diabetic eye  "exam in the last 12 months. Eye exam performed on last year. Location of last eye exam [facility].        Hypertension: She presents for follow up of hypertension.  She does check blood pressure  regularly outside of the clinic. Outpatient blood pressures have not been over 140/90. She follows a low salt diet.     Headaches:   Since the patient's last clinic visit, headaches are: worsened  The patient is getting headaches:  3 times a week  She is not able to do normal daily activities when she has a migraine.  The patient is taking the following rescue/relief medications:  Excedrin   Patient states \"I get some relief\" from the rescue/relief medications.   The patient is taking the following medications to prevent migraines:  Other  In the past 4 weeks, the patient has gone to an Urgent Care or Emergency Room 0 times times due to headaches.    Reason for visit:  Follow up    She eats 2-3 servings of fruits and vegetables daily.She consumes 4 sweetened beverage(s) daily.She exercises with enough effort to increase her heart rate 9 or less minutes per day.  She exercises with enough effort to increase her heart rate 3 or less days per week.   She is taking medications regularly.        Patient last seen in February 2022  Will recently has been getting [facility]  Using Freestyle Billy 3 CGM for monitoring blood sugars.  CGM  Average blood sugar for the 4 quartiles of the day  (mn-6am, 6am-noon, noon-6pm, 6pm-mn) as below:  7 day: 164,186,214,178  14 day: 169,176,205,191  30 day: 179,149,206,204   90 day: 193,146,198,206    Currently taking Lantus 50 units at bedtime.  Using Humalog 8 to 10 units per meal.  Currently taking metformin 1000 mg twice daily, atorvastatin 10 mg daily, losartan 1 mg daily and baby aspirin in addition to omeprazole and vitamin D  Patient taking Excedrin tablets 2-4 times a week if having stress.  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  Describes this as a " "stressful environment helping clients.  Has not had any recent cancer screening done.  Patient declines vaccinations she states she gets \"sick\" from them.  Previously trial of Farxiga per patient but developed increasing skin irritation in pathologic/pelvic areas and stopped medication  Morbid no exercise.  Blood sugar today per CGM is 197  Intermittent headaches as above.  No weakness or numbness in extremities.  No vision changes with headaches.  No nausea or vomiting.  Denies chest pain.  Occasional mild shortness of breath with stress.  Intermittent epigastric pain. No blood seen in stools. Excedrine use as above.  Daily ASA 81mg daily. No other NSAIDS.   Due for eye exam       Additional ROS:   Constitutional, HEENT, Cardiovascular, Pulmonary, GI and , Neuro, MSK and Psych review of systems/symptoms are otherwise negative or unchanged from previous, except as noted above.      OBJECTIVE:  BP (!) 154/98   Pulse 96   Temp 98.4  F (36.9  C) (Temporal)   Ht 1.6 m (5' 3\")   Wt 84.5 kg (186 lb 4.8 oz)   LMP 07/10/2009   SpO2 96%   BMI 33.00 kg/m     Estimated body mass index is 33 kg/m  as calculated from the following:    Height as of this encounter: 1.6 m (5' 3\").    Weight as of this encounter: 84.5 kg (186 lb 4.8 oz).  Eye: PERRL, EOMI  HENT: ear canals and TM's normal and nose and mouth without ulcers or lesions.  Sinuses nontender to palpation.  No TMJ or TA area tenderness palpation  Neck: no adenopathy. Thyroid normal to palpation. No bruits  Pulm: Lungs clear to auscultation   CV: Regular rates and rhythm  GI: Soft, obese, nontender, Normal active bowel sounds, No hepatosplenomegaly or masses palpable  Ext: Peripheral pulses intact. No edema.  Neuro: Normal strength and tone, sensory exam grossly normal              "

## 2023-10-16 NOTE — COMMUNITY RESOURCES LIST (ENGLISH)
10/16/2023   [facility]  N/A  For questions about this resource list or additional care needs, please contact your primary care clinic or care manager.  Phone: [phone]   Email: N/A   Address: [address] [place], [place] [address]   Hours: N/A        Financial Stability       Rent and mortgage payment assistance  1  [facility] Distance: 1.09 miles      In-Person, Phone/Virtual   [address] [place] [address]  Language: English  Hours: Mon - Fri 8:00 AM - 4:30 PM  Fees: Free   Phone: ([phone] Email: serviceElixent@Valentin UzhunRio Grande HospitalTalento al Aula Website: https://www.meebee/yourMidfin Systems/facilities/service-center-info     2  [facility] - Rent Assistance Distance: 2.42 miles      In-Person   [address] [place] [address]  Language: English  Hours: Mon - Fri 9:00 AM - 5:00 PM  Fees: Free   Phone: (888) 429-3262 Email: info@benita.B[employer]hez [employer] Website: http://www.benita.[facility]/     Utility payment assistance  3  [facility] - Low Income Energy Assistance Program (LIHEAP) application assistance Distance: 1.09 miles      In-Person, Phone/Virtual   [address] [place] [address]  Language: English  Hours: Mon - Fri 8:00 AM - 4:30 PM  Fees: Free   Phone: (278) 783-6471 Email: serviceElixent@Freeze Tag Website: https://www.meebee/your-government/facilities/service-center-info     4  [employer] - ???????Gas Affordability Program Distance: 2.44 miles      Phone/Virtual   [address] [address] [place] [address]  Language: English  Hours: Mon - Sun Open 24 Hours  Fees: Sliding Fee   Phone: (770) 440-3725 Website: http://www.Crocus Technology/en-us/residential/customer-service/billing-payment/need-help-paying-your-bill?sa=[place]          Food and Nutrition       Food pantry  5  Good in the  [facility] Distance: 0.34 miles      Pickup   [address] [place] [address]  Language: English  Hours: Thu 5:00 PM - 7:00 PM  Fees: Free   Phone: (605) 911-9658 Email: info@Hello Market Website: http://www.Learn It Systems.org/Our-Programs/Feeding-the-Future/Food-in-the-[place]     6  [facility] Food Giveaway Distance: 0.57 miles      In-Person   [address] [place] [address]  Language: English, Afghan  Hours: Wed - Fri 10:00 AM - 2:00 PM  Fees: Free   Phone: (888) 587-7642 Email: info@Syrenaica Website: http://www.Syrenaica/     SNAP application assistance  7  [facility] Human Services and Public Health [facility] Distance: 1.09 miles      In-Person, Phone/Virtual   [address] [place] [address]  Language: English  Hours: Mon - Fri 8:00 AM - 4:30 PM  Fees: Free   Phone: (185) 441-8288 Email: servicecenterinfo@[facility] Website: https://www.[facility]/facilities/service-center-info     8  [facility] ([facility] Distance: 1.78 miles      In-Person, Phone/Virtual   [address] [place] [address]  Language: English, Greek, Albanian  Hours: Mon - Fri 9:00 AM - 5:00 PM  Fees: Free   Phone: (158) 175-9701 Email: kylee@Hongdianzhibo Website: https://www.Basetex Group.com/PlayFitness.org/     Soup kitchen or free meals  9  [place] [employer] [facility] - Community Kitchen Distance: 0.65 miles      In-Person, Pickup   [address] [place] [address]  Language: English  Hours: Mon - Fri 3:00 PM - 9:00 PM , Sat 9:00 AM - 4:00 PM  Fees: Free   Phone: (588) 973-1219 Email: miesha@[facility] Website: https://www.[facility].org/parks__destinations/recreation_centers/[place]_recreation_center/     10  [facility] [place] [facility] - Abi and Fishes  Distance: 0.65 miles      In-Person   [address] [place] [address]  Language: English  Hours: Mon - Fri 12:30 PM - 1:30 PM  Fees: Free   Phone: (225) 963-8895 Email: iban@BAE Systems Website: https://BAE Systems/          Important Numbers & Websites       Emergency Services   911  [facility] Services   311  Poison Control   (171) 550-6784  Suicide Prevention Lifeline   (698) 332-4732 (TALK)  Child Abuse Hotline   (317) 262-9168 (4-A-Child)  Sexual Assault Hotline   (758) 701-7236 (HOPE)  National Runaway Safeline   (284) 130-7133 (RUNAWAY)  All-Options Talkline   (541) 274-1179  Substance Abuse Referral   (234) 722-5021 (HELP)

## 2024-03-11 DIAGNOSIS — R80.9 PROTEINURIA, UNSPECIFIED TYPE: ICD-10-CM

## 2024-03-11 DIAGNOSIS — I10 ESSENTIAL HYPERTENSION: ICD-10-CM

## 2024-03-12 RX ORDER — LOSARTAN POTASSIUM 100 MG/1
100 TABLET ORAL DAILY
Qty: 90 TABLET | Refills: 0 | Status: SHIPPED | OUTPATIENT
Start: 2024-03-12 | End: 2024-07-15 | Stop reason: ALTCHOICE

## 2024-03-20 DIAGNOSIS — E11.9 TYPE 2 DIABETES MELLITUS WITHOUT COMPLICATION, WITH LONG-TERM CURRENT USE OF INSULIN (H): ICD-10-CM

## 2024-03-20 DIAGNOSIS — Z79.4 TYPE 2 DIABETES MELLITUS WITHOUT COMPLICATION, WITH LONG-TERM CURRENT USE OF INSULIN (H): ICD-10-CM

## 2024-03-20 RX ORDER — BLOOD-GLUCOSE SENSOR
1 EACH MISCELLANEOUS
Qty: 6 EACH | Refills: 3 | Status: SHIPPED | OUTPATIENT
Start: 2024-03-20 | End: 2024-07-15

## 2024-03-20 NOTE — TELEPHONE ENCOUNTER
Patient is currently without insurance, and is waiting to receive insurance coverage before scheduling an appointment as she cannot afford to pay out of pocket.

## 2024-03-21 NOTE — TELEPHONE ENCOUNTER
Will cover Metformin Rx for 3 months to give pt time to apply  for insurance. Rx sent for that along with requested PIERIS Proteolabe CGM sensors

## 2024-06-12 DIAGNOSIS — E11.9 TYPE 2 DIABETES MELLITUS WITHOUT COMPLICATION, WITH LONG-TERM CURRENT USE OF INSULIN (H): ICD-10-CM

## 2024-06-12 DIAGNOSIS — Z79.4 TYPE 2 DIABETES MELLITUS WITHOUT COMPLICATION, WITH LONG-TERM CURRENT USE OF INSULIN (H): ICD-10-CM

## 2024-06-12 NOTE — LETTER
[address] [address]  [phone]  July 9, 2024  Devi COHEN [facility] [address]    Dear Devi,    You are in need of a visit to address the diabetes. Please contact the clinic and let us know your insurance status now so we can figure out next steps to continue care for you. Please call [phone].     Thank you,     Dr Mcclure's care team

## 2024-06-13 DIAGNOSIS — E11.9 TYPE 2 DIABETES MELLITUS WITHOUT COMPLICATION, WITH LONG-TERM CURRENT USE OF INSULIN (H): ICD-10-CM

## 2024-06-13 DIAGNOSIS — Z79.4 TYPE 2 DIABETES MELLITUS WITHOUT COMPLICATION, WITH LONG-TERM CURRENT USE OF INSULIN (H): ICD-10-CM

## 2024-06-13 NOTE — TELEPHONE ENCOUNTER
Refill request from March, patient did not have health insurance at that time and was waiting to receive insurance coverage.  Last diabetic lab monitoring done October 2023 showing need for diabetic control at that time.  Call patient and find out insurance status at this point.  Cannot continue prescribing medication long-term without follow-up evaluation.  Route update back to me and will then address as appropriate

## 2024-06-13 NOTE — TELEPHONE ENCOUNTER
Prescription approved per [facility] Refill Protocol.  Simran Bautista, RN  [facility] Triage Nurse

## 2024-06-13 NOTE — TELEPHONE ENCOUNTER
Attempted to call pt voicemail not set up yet so unable to leave a message. Will try again later.

## 2024-06-23 NOTE — TELEPHONE ENCOUNTER
Spoke with pt notified to schedule upcoming appt with PCP.  Lantus is covered instead of the Basaglar now.   Please advise.    [facility]

## 2024-07-02 ENCOUNTER — TELEPHONE (OUTPATIENT)
Dept: INTERNAL MEDICINE | Facility: CLINIC | Age: 64
End: 2024-07-02
Payer: COMMERCIAL

## 2024-07-02 DIAGNOSIS — E03.9 HYPOTHYROIDISM, UNSPECIFIED TYPE: Primary | ICD-10-CM

## 2024-07-02 DIAGNOSIS — E78.5 HYPERLIPIDEMIA WITH TARGET LDL LESS THAN 100: ICD-10-CM

## 2024-07-02 DIAGNOSIS — R80.9 PROTEINURIA, UNSPECIFIED TYPE: ICD-10-CM

## 2024-07-02 DIAGNOSIS — E11.9 TYPE 2 DIABETES MELLITUS WITHOUT COMPLICATION, WITH LONG-TERM CURRENT USE OF INSULIN (H): ICD-10-CM

## 2024-07-02 DIAGNOSIS — Z79.4 TYPE 2 DIABETES MELLITUS WITHOUT COMPLICATION, WITH LONG-TERM CURRENT USE OF INSULIN (H): ICD-10-CM

## 2024-07-02 NOTE — TELEPHONE ENCOUNTER
" Note below states pt prefers to be seen on a Monday and no openings next Monday 7/8. Pt to see me Monday  7/15/24 in one of the open \"next day\" appt slots for appt with me in clinic. Schedule appt  with me and also schedule a fasting lab appt to be done in the week prior to the appt with me so that I can review lab results with pt when seen. Also tell pt to  check blood sugars four times a day (before meals and bedtime) for the 4 days prior to the appointment with me,  write them down and have them available to review with the appointment    Will address other mentioned concerns at appt in clinic as appropriate after reviewing further with pt when seen  Inform pt  "

## 2024-07-02 NOTE — TELEPHONE ENCOUNTER
Reason for Call:  Appointment Request    Patient requesting this type of appt: Chronic Diease Management/Medication/Follow-Up    Requested provider: Emanuel Mcclure    Reason patient unable to be scheduled: Not within requested timeframe    When does patient want to be seen/preferred time: 3-7 days - best days to schedule are Mondays    Comments: Patient needs to come in for a diabetes follow up and med check. Son is calling to schedule, soonest in person is 10/2 and soonest virtual that would work for the patient is 7/15. Son said that PCP prefers to see patients in person and they could set up a virtual appointment if that was okay with PCP but patient would need refills to last until then. Otherwise, son asks if PCP can look at his schedule to see if there's a sooner date the patient can come in in person.  Patient also needs paperwork done to get handicap stickers, needs PCP to request for this. 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    Okay to leave a detailed message?: No at Cell number on file:    Telephone Information:   Mobile [phone]     Son says voicemail is probably full, if unable to reach her clinic Michelle can be reached at [phone]    Call taken on 7/2/2024 at 12:18 PM by Negin Paredes

## 2024-07-02 NOTE — TELEPHONE ENCOUNTER
Relayed providers response to Michelle dai. Michelle scheduled patient as provider recommended, requests to be transferred to central scheduling to schedule lab appt at a location closer to patient's home.   Call transferred as requested.

## 2024-07-08 ENCOUNTER — LAB (OUTPATIENT)
Dept: LAB | Facility: CLINIC | Age: 64
End: 2024-07-08
Payer: COMMERCIAL

## 2024-07-08 DIAGNOSIS — E78.5 HYPERLIPIDEMIA WITH TARGET LDL LESS THAN 100: ICD-10-CM

## 2024-07-08 DIAGNOSIS — E11.9 TYPE 2 DIABETES MELLITUS WITHOUT COMPLICATION, WITH LONG-TERM CURRENT USE OF INSULIN (H): ICD-10-CM

## 2024-07-08 DIAGNOSIS — E03.9 HYPOTHYROIDISM, UNSPECIFIED TYPE: ICD-10-CM

## 2024-07-08 DIAGNOSIS — R80.9 PROTEINURIA, UNSPECIFIED TYPE: ICD-10-CM

## 2024-07-08 DIAGNOSIS — Z79.4 TYPE 2 DIABETES MELLITUS WITHOUT COMPLICATION, WITH LONG-TERM CURRENT USE OF INSULIN (H): ICD-10-CM

## 2024-07-08 LAB — HBA1C MFR BLD: 9.1 % (ref 0–5.6)

## 2024-07-08 PROCEDURE — 36415 COLL VENOUS BLD VENIPUNCTURE: CPT

## 2024-07-08 PROCEDURE — 82570 ASSAY OF URINE CREATININE: CPT

## 2024-07-08 PROCEDURE — 82043 UR ALBUMIN QUANTITATIVE: CPT

## 2024-07-08 PROCEDURE — 84443 ASSAY THYROID STIM HORMONE: CPT

## 2024-07-08 PROCEDURE — 80053 COMPREHEN METABOLIC PANEL: CPT

## 2024-07-08 PROCEDURE — 83036 HEMOGLOBIN GLYCOSYLATED A1C: CPT

## 2024-07-08 PROCEDURE — 80061 LIPID PANEL: CPT

## 2024-07-09 LAB
ALBUMIN SERPL BCG-MCNC: 4.2 G/DL (ref 3.5–5.2)
ALP SERPL-CCNC: 115 U/L (ref 40–150)
ALT SERPL W P-5'-P-CCNC: 16 U/L (ref 0–50)
ANION GAP SERPL CALCULATED.3IONS-SCNC: 10 MMOL/L (ref 7–15)
AST SERPL W P-5'-P-CCNC: 19 U/L (ref 0–45)
BILIRUB SERPL-MCNC: 0.5 MG/DL
BUN SERPL-MCNC: 15 MG/DL (ref 8–23)
CALCIUM SERPL-MCNC: 9.4 MG/DL (ref 8.8–10.2)
CHLORIDE SERPL-SCNC: 105 MMOL/L (ref 98–107)
CHOLEST SERPL-MCNC: 155 MG/DL
CREAT SERPL-MCNC: 1.02 MG/DL (ref 0.51–0.95)
CREAT UR-MCNC: 204 MG/DL
DEPRECATED HCO3 PLAS-SCNC: 25 MMOL/L (ref 22–29)
EGFRCR SERPLBLD CKD-EPI 2021: 61 ML/MIN/1.73M2
FASTING STATUS PATIENT QL REPORTED: YES
FASTING STATUS PATIENT QL REPORTED: YES
GLUCOSE SERPL-MCNC: 143 MG/DL (ref 70–99)
HDLC SERPL-MCNC: 42 MG/DL
LDLC SERPL CALC-MCNC: 81 MG/DL
MICROALBUMIN UR-MCNC: 77.1 MG/L
MICROALBUMIN/CREAT UR: 37.79 MG/G CR (ref 0–25)
NONHDLC SERPL-MCNC: 113 MG/DL
POTASSIUM SERPL-SCNC: 4.1 MMOL/L (ref 3.4–5.3)
PROT SERPL-MCNC: 7.3 G/DL (ref 6.4–8.3)
SODIUM SERPL-SCNC: 140 MMOL/L (ref 135–145)
TRIGL SERPL-MCNC: 160 MG/DL
TSH SERPL DL<=0.005 MIU/L-ACNC: 3.64 UIU/ML (ref 0.3–4.2)

## 2024-07-09 NOTE — TELEPHONE ENCOUNTER
3rd attempt to call patient with no answer and no voicemail set up. Mailed letter to patient please remove pended meds and close encounter

## 2024-07-09 NOTE — TELEPHONE ENCOUNTER
Dr. Mcclure,    Multiple attempts have been made to reach the patient. Please advise regarding refill request.     Thank you,  Simran Bautista RN

## 2024-07-12 DIAGNOSIS — I10 ESSENTIAL HYPERTENSION: ICD-10-CM

## 2024-07-12 DIAGNOSIS — R51.9 CHRONIC NONINTRACTABLE HEADACHE, UNSPECIFIED HEADACHE TYPE: ICD-10-CM

## 2024-07-12 DIAGNOSIS — G89.29 CHRONIC NONINTRACTABLE HEADACHE, UNSPECIFIED HEADACHE TYPE: ICD-10-CM

## 2024-07-12 DIAGNOSIS — E11.9 TYPE 2 DIABETES MELLITUS WITHOUT COMPLICATION, WITH LONG-TERM CURRENT USE OF INSULIN (H): ICD-10-CM

## 2024-07-12 DIAGNOSIS — R80.9 PROTEINURIA, UNSPECIFIED TYPE: ICD-10-CM

## 2024-07-12 DIAGNOSIS — Z79.4 TYPE 2 DIABETES MELLITUS WITHOUT COMPLICATION, WITH LONG-TERM CURRENT USE OF INSULIN (H): ICD-10-CM

## 2024-07-15 ENCOUNTER — OFFICE VISIT (OUTPATIENT)
Dept: INTERNAL MEDICINE | Facility: CLINIC | Age: 64
End: 2024-07-15
Payer: COMMERCIAL

## 2024-07-15 VITALS
OXYGEN SATURATION: 97 % | DIASTOLIC BLOOD PRESSURE: 104 MMHG | BODY MASS INDEX: 33.49 KG/M2 | HEIGHT: 63 IN | HEART RATE: 84 BPM | SYSTOLIC BLOOD PRESSURE: 181 MMHG | WEIGHT: 189 LBS | TEMPERATURE: 98.1 F

## 2024-07-15 DIAGNOSIS — R51.9 CHRONIC NONINTRACTABLE HEADACHE, UNSPECIFIED HEADACHE TYPE: ICD-10-CM

## 2024-07-15 DIAGNOSIS — E11.9 TYPE 2 DIABETES MELLITUS WITHOUT COMPLICATION, WITH LONG-TERM CURRENT USE OF INSULIN (H): ICD-10-CM

## 2024-07-15 DIAGNOSIS — R87.610 ATYPICAL SQUAMOUS CELLS OF UNDETERMINED SIGNIFICANCE ON CYTOLOGIC SMEAR OF CERVIX (ASC-US): ICD-10-CM

## 2024-07-15 DIAGNOSIS — Z79.4 TYPE 2 DIABETES MELLITUS WITHOUT COMPLICATION, WITH LONG-TERM CURRENT USE OF INSULIN (H): ICD-10-CM

## 2024-07-15 DIAGNOSIS — E78.5 HYPERLIPIDEMIA WITH TARGET LDL LESS THAN 100: ICD-10-CM

## 2024-07-15 DIAGNOSIS — R80.9 TYPE 2 DIABETES MELLITUS WITH DIABETIC MICROALBUMINURIA, WITH LONG-TERM CURRENT USE OF INSULIN (H): ICD-10-CM

## 2024-07-15 DIAGNOSIS — I10 HYPERTENSION, UNSPECIFIED TYPE: ICD-10-CM

## 2024-07-15 DIAGNOSIS — Z12.11 SCREEN FOR COLON CANCER: ICD-10-CM

## 2024-07-15 DIAGNOSIS — Z12.31 ENCOUNTER FOR SCREENING MAMMOGRAM FOR BREAST CANCER: Primary | ICD-10-CM

## 2024-07-15 DIAGNOSIS — Z12.31 ENCOUNTER FOR SCREENING MAMMOGRAM FOR BREAST CANCER: ICD-10-CM

## 2024-07-15 DIAGNOSIS — K21.9 GASTROESOPHAGEAL REFLUX DISEASE WITHOUT ESOPHAGITIS: ICD-10-CM

## 2024-07-15 DIAGNOSIS — Z79.4 TYPE 2 DIABETES MELLITUS WITH DIABETIC MICROALBUMINURIA, WITH LONG-TERM CURRENT USE OF INSULIN (H): ICD-10-CM

## 2024-07-15 DIAGNOSIS — G89.29 CHRONIC NONINTRACTABLE HEADACHE, UNSPECIFIED HEADACHE TYPE: ICD-10-CM

## 2024-07-15 DIAGNOSIS — E11.29 TYPE 2 DIABETES MELLITUS WITH DIABETIC MICROALBUMINURIA, WITH LONG-TERM CURRENT USE OF INSULIN (H): ICD-10-CM

## 2024-07-15 DIAGNOSIS — F32.0 CURRENT MILD EPISODE OF MAJOR DEPRESSIVE DISORDER WITHOUT PRIOR EPISODE (H): ICD-10-CM

## 2024-07-15 DIAGNOSIS — Z00.01 ENCOUNTER FOR ROUTINE ADULT MEDICAL EXAM WITH ABNORMAL FINDINGS: Primary | ICD-10-CM

## 2024-07-15 DIAGNOSIS — E66.811 CLASS 1 OBESITY WITH SERIOUS COMORBIDITY AND BODY MASS INDEX (BMI) OF 33.0 TO 33.9 IN ADULT, UNSPECIFIED OBESITY TYPE: ICD-10-CM

## 2024-07-15 PROCEDURE — 99214 OFFICE O/P EST MOD 30 MIN: CPT | Mod: 25 | Performed by: INTERNAL MEDICINE

## 2024-07-15 PROCEDURE — 99396 PREV VISIT EST AGE 40-64: CPT | Performed by: INTERNAL MEDICINE

## 2024-07-15 RX ORDER — LOSARTAN POTASSIUM AND HYDROCHLOROTHIAZIDE 12.5; 1 MG/1; MG/1
1 TABLET ORAL DAILY
Qty: 30 TABLET | Refills: 5 | Status: SHIPPED | OUTPATIENT
Start: 2024-07-15

## 2024-07-15 RX ORDER — ATORVASTATIN CALCIUM 10 MG/1
10 TABLET, FILM COATED ORAL DAILY
Qty: 90 TABLET | Refills: 3 | Status: SHIPPED | OUTPATIENT
Start: 2024-07-15

## 2024-07-15 RX ORDER — BLOOD-GLUCOSE SENSOR
1 EACH MISCELLANEOUS
Qty: 6 EACH | Refills: 3 | Status: SHIPPED | OUTPATIENT
Start: 2024-07-15

## 2024-07-15 SDOH — HEALTH STABILITY: PHYSICAL HEALTH: ON AVERAGE, HOW MANY DAYS PER WEEK DO YOU ENGAGE IN MODERATE TO STRENUOUS EXERCISE (LIKE A BRISK WALK)?: 7 DAYS

## 2024-07-15 SDOH — HEALTH STABILITY: PHYSICAL HEALTH: ON AVERAGE, HOW MANY MINUTES DO YOU ENGAGE IN EXERCISE AT THIS LEVEL?: 40 MIN

## 2024-07-15 ASSESSMENT — PATIENT HEALTH QUESTIONNAIRE - PHQ9
SUM OF ALL RESPONSES TO PHQ QUESTIONS 1-9: 6
10. IF YOU CHECKED OFF ANY PROBLEMS, HOW DIFFICULT HAVE THESE PROBLEMS MADE IT FOR YOU TO DO YOUR WORK, TAKE CARE OF THINGS AT HOME, OR GET ALONG WITH OTHER PEOPLE: NOT DIFFICULT AT ALL
SUM OF ALL RESPONSES TO PHQ QUESTIONS 1-9: 6

## 2024-07-15 ASSESSMENT — SOCIAL DETERMINANTS OF HEALTH (SDOH): HOW OFTEN DO YOU GET TOGETHER WITH FRIENDS OR RELATIVES?: NEVER

## 2024-07-15 NOTE — PROGRESS NOTES
Preventive Care Visit  [facility]  Emanuel Mcclure MD, Internal Medicine  Jul 15, 2024  {Provider  Link to [facility] :448734}    {PROVIDER CHARTING PREFERENCE:073767}    Maxine Quinonez is a 64 year old, presenting for the following:  Diabetes, Recheck Medication, Consult, and Physical  Pt stopped taking amlodipine because they were having bad leg swelling in calves. Pt noticed that stopped after she stopped taking the medication.  Pt has been having a heaviness in chest area, pt reports when having that heavy feeling they feel short of breath.Pt states it started about 2 months ago.      Health Care Directive  Patient does not have a Health Care Directive or Living Will: {ADVANCE_DIRECTIVE_STATUS:619046}    HPI  ***  {MA/LPN/RN Pre-Provider Visit Orders- hCG/UA/Strep (Optional):940370}  {SUPERLIST (Optional):863954}  {additonal problems for provider to add (Optional):770939}      7/15/2024   General Health   How would you rate your overall physical health? Good   Feel stress (tense, anxious, or unable to sleep) To some extent      (!) STRESS CONCERN      7/15/2024   Nutrition   Three or more servings of calcium each day? (!) NO   Diet: Low salt   How many servings of fruit and vegetables per day? (!) 2-3   How many sweetened beverages each day? (!) 2            7/15/2024   Exercise   Days per week of moderate/strenous exercise 7 days   Average minutes spent exercising at this level 40 min            7/15/2024   Social Factors   Frequency of gathering with friends or relatives Never   Worry food won't last until get money to buy more Patient declined   Food not last or not have enough money for food? Patient declined   Do you have housing? (Housing is defined as stable permanent housing and does not include staying ouside in a car, in a tent, in an abandoned building, in an overnight shelter, or couch-surfing.) Patient declined   Are you worried about losing your housing? Patient declined    Lack of transportation? Patient declined   Unable to get utilities (heat,electricity)? Patient declined      (!) SOCIAL CONNECTIONS CONCERN      7/15/2024   Fall Risk   Fallen 2 or more times in the past year? No   Trouble with walking or balance? Yes        {Positive Fall Risk- Gait Speed Test is required and was not documented before note was started.  If results do not appear, ask staff to complete.  Once completed, refresh note to pull in results Click here to link Gait Speed Test  :661405}      7/15/2024   Dental   Dentist two times every year? Yes            7/15/2024   TB Screening   Were you born outside of the [place]? Yes          Today's PHQ-9 Score:       7/15/2024     9:54 AM   PHQ-9 SCORE   PHQ-9 Total Score MyChart 6 (Mild depression)   PHQ-9 Total Score 6         7/15/2024   Substance Use   Alcohol more than 3/day or more than 7/wk Not Applicable   Do you use any other substances recreationally? No        Social History     Tobacco Use    Smoking status: Never    Smokeless tobacco: Never   Substance Use Topics    Alcohol use: No    Drug use: No     {Provider  If there are gaps in the social history shown above, please follow the link to update and then refresh the note Link to Social and Substance History :582436}     {Mammogram Decision Support (Optional):655362}          7/15/2024   One time HIV Screening   Previous HIV test? I don't know          7/15/2024   STI Screening   New sexual partner(s) since last STI/HIV test? (!) DECLINE        History of abnormal Pap smear: { :650739}        Latest Ref Rng & Units 2/26/2018    10:40 AM 2/26/2018    10:21 AM 8/17/2015    11:00 AM   PAP / HPV   PAP (Historical)   ASC-US     HPV 16 DNA NEG^Negative Negative   Negative    HPV 18 DNA NEG^Negative Negative   Negative    Other HR HPV NEG^Negative Positive   Positive      ASCVD Risk   The 10-year ASCVD risk score (Shani GILLESPIE, et al., 2019) is: 17.4%    Values used to calculate the score:      Age: 64  years      Sex: Female      Is Non- : No      Diabetic: Yes      Tobacco smoker: No      Systolic Blood Pressure: 154 mmHg      Is BP treated: Yes      HDL Cholesterol: 42 mg/dL      Total Cholesterol: 155 mg/dL    {Link to Fracture Risk Assessment Tool (Optional):627618}    {Provider  REQUIRED FOR AWV Use the storyboard to review patient history, after sections have been marked as reviewed, refresh note to capture documentation:265041}   Reviewed and updated as needed this visit by Provider                    Component      Latest Ref Rng 10/16/2023  4:31 PM 10/16/2023  5:20 PM 7/8/2024  2:26 PM   Sodium      135 - 145 mmol/L 141   140    Potassium      3.4 - 5.3 mmol/L 4.2   4.1    Carbon Dioxide (CO2)      22 - 29 mmol/L 25   25    Anion Gap      7 - 15 mmol/L 11   10    Urea Nitrogen      8.0 - 23.0 mg/dL 13.0   15.0    Creatinine      0.51 - 0.95 mg/dL 1.08 (H)   1.02 (H)    GFR Estimate      >60 mL/min/1.73m2 57 (L)   61    Calcium      8.8 - 10.2 mg/dL 9.5   9.4    Chloride      98 - 107 mmol/L 105   105    Glucose      70 - 99 mg/dL 161 (H)   143 (H)    Alkaline Phosphatase      40 - 150 U/L 119 (H)   115    AST      0 - 45 U/L 19   19    ALT      0 - 50 U/L 15   16    Protein Total      6.4 - 8.3 g/dL 7.7   7.3    Albumin      3.5 - 5.2 g/dL 4.4   4.2    Bilirubin Total      <=1.2 mg/dL 0.4   0.5    Patient Fasting?   Yes    Patient Fasting?   Yes    Cholesterol      <200 mg/dL 110   155    Triglycerides      <150 mg/dL 107   160 (H)    HDL Cholesterol      >=50 mg/dL 40 (L)   42 (L)    LDL Cholesterol Calculated      <=100 mg/dL 49   81    Non HDL Cholesterol      <130 mg/dL 70   113    Creatinine Urine      mg/dL  235.0  204.0    Albumin Urine mg/L      mg/L  170.0  77.1    Albumin Urine mg/g Cr      0.00 - 25.00 mg/g Cr  72.34 (H)  37.79 (H)    Hemoglobin A1C      0.0 - 5.6 % 8.4 (H)   9.1 (H)    TSH      0.30 - 4.20 uIU/mL 3.49   3.64       Legend:  (H) High  (L) Low    {ROS  "Picklists (Optional):518935}     Objective    Exam  LMP 07/10/2009    Estimated body mass index is 33 kg/m  as calculated from the following:    Height as of 10/16/23: 1.6 m (5' 3\").    Weight as of 10/16/23: 84.5 kg (186 lb 4.8 oz).    Physical Exam  {Exam Choices (Optional):030806}        Signed Electronically by: Emanuel Mcclure MD  {Email feedback regarding this note to [email or web address]   :727058}  "  Tobacco smoker: No      Systolic Blood Pressure: 154 mmHg      Is BP treated: Yes      HDL Cholesterol: 42 mg/dL      Total Cholesterol: 155 mg/dL            Component      Latest Ref Rng 10/16/2023  4:31 PM 10/16/2023  5:20 PM 7/8/2024  2:26 PM   Sodium      135 - 145 mmol/L 141   140    Potassium      3.4 - 5.3 mmol/L 4.2   4.1    Carbon Dioxide (CO2)      22 - 29 mmol/L 25   25    Anion Gap      7 - 15 mmol/L 11   10    Urea Nitrogen      8.0 - 23.0 mg/dL 13.0   15.0    Creatinine      0.51 - 0.95 mg/dL 1.08 (H)   1.02 (H)    GFR Estimate      >60 mL/min/1.73m2 57 (L)   61    Calcium      8.8 - 10.2 mg/dL 9.5   9.4    Chloride      98 - 107 mmol/L 105   105    Glucose      70 - 99 mg/dL 161 (H)   143 (H)    Alkaline Phosphatase      40 - 150 U/L 119 (H)   115    AST      0 - 45 U/L 19   19    ALT      0 - 50 U/L 15   16    Protein Total      6.4 - 8.3 g/dL 7.7   7.3    Albumin      3.5 - 5.2 g/dL 4.4   4.2    Bilirubin Total      <=1.2 mg/dL 0.4   0.5    Patient Fasting?   Yes    Patient Fasting?   Yes    Cholesterol      <200 mg/dL 110   155    Triglycerides      <150 mg/dL 107   160 (H)    HDL Cholesterol      >=50 mg/dL 40 (L)   42 (L)    LDL Cholesterol Calculated      <=100 mg/dL 49   81    Non HDL Cholesterol      <130 mg/dL 70   113    Creatinine Urine      mg/dL  235.0  204.0    Albumin Urine mg/L      mg/L  170.0  77.1    Albumin Urine mg/g Cr      0.00 - 25.00 mg/g Cr  72.34 (H)  37.79 (H)    Hemoglobin A1C      0.0 - 5.6 % 8.4 (H)   9.1 (H)    TSH      0.30 - 4.20 uIU/mL 3.49   3.64        Pt's past medical history, family history, habits, medications and allergies were reviewed with the patient today.   Most recent lab results reviewed with pt. Problem list and   reviewed & adjusted, as indicated.        Review of Systems  CONSTITUTIONAL: NEGATIVE for fever, chills. Weight up 3 pounds  INTEGUMENTARY/SKIN: NEGATIVE for worrisome rashes, moles or lesions  EYES: NEGATIVE for vision changes or  "irritation. Due for eye exam  ENT/MOUTH: NEGATIVE for ear, mouth and throat problems  RESP: NEGATIVE for significant cough or SOB  BREAST: NEGATIVE for masses, tenderness or discharge  CV: NEGATIVE for chest pain, palpitations or peripheral edema  GI: NEGATIVE for nausea, abdominal pain, heartburn. Occ loser stools with Metformin if not following DM diet  : NEGATIVE for frequency, dysuria, or hematuria. Hx abnormal pap and overdue for colposcopy. Lester vaginal bleeding  MUSCULOSKELETAL: NEGATIVE for significant arthralgias or myalgia except for some soreness in chest  sternal area  with twisting and palpation and some with bending. Not related to  walking. 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 Has numbness in toes. Rare headache if taking Gabapentin  ENDOCRINE: NEGATIVE for temperature intolerance. POSITIVE for DM/. Using lantus 50 units at bedtime and Humalog 10 units with one meal eaten/day about 6pm. Blood sugars variable from 100-300.  Partial compliance with DM diet. Declines DEXA  HEME: NEGATIVE for bleeding problems  PSYCHIATRIC:  POSITIVE for mild depression. PHQ9 = 6. No suicidal ideation     Objective    Exam  BP (!) 164/92   Pulse 84   Temp 98.1  F (36.7  C) (Oral)   Ht 1.6 m (5' 3\")   Wt 85.7 kg (189 lb)   LMP 07/10/2009   SpO2 97%   BMI 33.48 kg/m     Estimated body mass index is 33 kg/m  as calculated from the following:    Height as of 10/16/23: 1.6 m (5' 3\").    Weight as of 10/16/23: 84.5 kg (186 lb 4.8 oz).    Physical Exam  General appearance -   alert, no distress  Skin - No rashes or lesions.  Head - normocephalic, atraumatic  Eyes - GIGI, EOMI, fundi exam with nondilated pupils negative.  Ears - External ears normal. Canals clear. TM's normal.  Nose/Sinuses - Nares normal. Septum midline. Mucosa normal. No drainage or sinus tenderness.  Oropharynx - No erythema, no adenopathy, no exudates.  Neck - Supple " without adenopathy or thyromegaly. No bruits.  Lungs - Clear to auscultation without wheezes/rhonchi.  Heart - Regular rate and rhythm without murmurs, clicks, or gallops.  Nodes - No supraclavicular, axillary, or inguinal adenopathy palpable.  Breasts - deferred  Abdomen - Abdomen obese, soft, non-tender. BS normal. No masses or hepatosplenomegaly palpable. No bruits.  Extremities -No cyanosis, clubbing or edema.    Musculoskeletal - Spine ROM normal. Muscular strength intact.  Mild tenderness to palpation parasternal musculature  Peripheral pulses - radial=4/4, femoral=4/4, posterior tibial=4/4, dorsalis pedis=4/4,  Neuro - Gait slower with obesity but stable. Gets up from chair without difficulty.   Sensation reduced to LTS in feet bilaterally  Genital/Rectal - deferred          Signed Electronically by: Emanuel Mcclure MD

## 2024-07-19 DIAGNOSIS — G89.29 CHRONIC NONINTRACTABLE HEADACHE, UNSPECIFIED HEADACHE TYPE: ICD-10-CM

## 2024-07-19 DIAGNOSIS — R51.9 CHRONIC NONINTRACTABLE HEADACHE, UNSPECIFIED HEADACHE TYPE: ICD-10-CM

## 2024-07-19 DIAGNOSIS — Z79.4 TYPE 2 DIABETES MELLITUS WITHOUT COMPLICATION, WITH LONG-TERM CURRENT USE OF INSULIN (H): ICD-10-CM

## 2024-07-19 DIAGNOSIS — E11.9 TYPE 2 DIABETES MELLITUS WITHOUT COMPLICATION, WITH LONG-TERM CURRENT USE OF INSULIN (H): ICD-10-CM

## 2024-07-19 RX ORDER — PEN NEEDLE, DIABETIC 31 GX5/16"
NEEDLE, DISPOSABLE MISCELLANEOUS
Qty: 100 EACH | Refills: 0 | Status: CANCELLED | OUTPATIENT
Start: 2024-07-19

## 2024-07-19 RX ORDER — GABAPENTIN 100 MG/1
100 CAPSULE ORAL 3 TIMES DAILY
Qty: 60 CAPSULE | Refills: 11 | Status: CANCELLED | OUTPATIENT
Start: 2024-07-19

## 2024-07-19 NOTE — TELEPHONE ENCOUNTER
metFORMIN (GLUCOPHAGE) 1000 MG tablet (Discontinued) needs refill for this per patient's son. This was discussed at 07/15 visit with PCP    Also requesting gabapentin (NEURONTIN) 100 MG capsule to be confirmed.

## 2024-07-24 NOTE — TELEPHONE ENCOUNTER
Pt's son Michelle called the clinic (no C2C on file) to inquire regarding refill requests.  Pt provided verbal consent over the phone to speak with Michelle.     He states that she is beginning to get headaches as she is out of gabapentin.   Routing refill request HP to PCP.     Per chart review, metformin was discontinued by PCP due to loose stools.     Michelle would like a call back when reviewed by PCP.    Thank you,  Marley Casas RN

## 2024-07-25 RX ORDER — GABAPENTIN 100 MG/1
100 CAPSULE ORAL 3 TIMES DAILY
Qty: 60 CAPSULE | Refills: 0 | OUTPATIENT
Start: 2024-07-25

## 2024-07-25 RX ORDER — GABAPENTIN 100 MG/1
100 CAPSULE ORAL 3 TIMES DAILY
Qty: 270 CAPSULE | Refills: 3 | Status: SHIPPED | OUTPATIENT
Start: 2024-07-25

## 2024-07-25 RX ORDER — LOSARTAN POTASSIUM 100 MG/1
100 TABLET ORAL DAILY
Qty: 90 TABLET | Refills: 0 | OUTPATIENT
Start: 2024-07-25

## 2024-07-25 NOTE — TELEPHONE ENCOUNTER
Called and spoke with pt son. Relayed providers message from below. Assisted with scheduling appt with PCP. Advised pts son to call us back with any other questions. Pts son agrees to plan.

## 2024-07-25 NOTE — TELEPHONE ENCOUNTER
Losartan was changed to Losartan/hydrochlorothiazide at last appt due to uncontrolled blood pressure so not refilled and Metformin has been stopped due to loose stools.

## 2024-07-25 NOTE — TELEPHONE ENCOUNTER
" Gabapentin refilled.  Pt to also start Jardiance 10mg tab once a day for diabetes.  Remain off of Metformin due to loose stools  TRxs for Gabapentin and Jardiance sent to pt's pharmacy  Pt to see diabetes Ed as previously ordered. No appt has been scheduled yet. Pt to call  [phone]  to schedule  Pt to also see me in 4 weeks to follow-up on blood sugar control and recheck blood pressure. Please assist in scheduling appt with me . OK to use open same day, danext day, [facility] appt slot for appt in person in clinic  Pt has a Freestyle Billy CGM  Pt to check blood sugars before each meal and bedtime every day so that data is recorded re\": blood sugars throughout the day and data is stored in reader for me to reviuew when seen in 4 weeks. Pt to be sure to bring reader with her to the appt ( or if using a cell phone with Freestyle Billy ronni, then bring in that phone to the appt with me    Also inform Michelle we received a refill request for Losartan in other encounter that was denied as pt was started on Losartan/hydrochlorothiazide at last appt since her blood pressure was not controlled with the plain Losartan and Rx for the combo BP med was sent to her pharmacy the day of last appt 7/15/24    "

## 2024-08-13 ENCOUNTER — TELEPHONE (OUTPATIENT)
Dept: INTERNAL MEDICINE | Facility: CLINIC | Age: 64
End: 2024-08-13
Payer: COMMERCIAL

## 2024-08-13 NOTE — TELEPHONE ENCOUNTER
Prior Authorization Retail Medication Request    Medication/Dose: Continuous Glucose Sensor (FREESTYLE LANA 3 SENSOR) MISC   Diagnosis and ICD code (if different than what is on RX):  [E11.9, Z79.4]     Insurance   Payor: SALMA  [facility]  Sponsor Code: [id number]  Subscriber ID: [id number]     Pharmacy Information (if different than what is on RX)  Name:  [facility]  Phone:  [phone]  Fax:[phone]

## 2024-08-14 NOTE — TELEPHONE ENCOUNTER
PA Initiation    Medication: FREESTYLE LANA 3 SENSOR [facility]  Insurance Company: [employer] - Phone [phone] Fax [phone]  [facility]  Filling Pharmacy Phone: [phone]  Filling Pharmacy Fax: [phone]  Start Date: 8/14/2024

## 2024-08-15 NOTE — TELEPHONE ENCOUNTER
Prior Authorization Not Needed per Insurance    Medication: FREESTYLE LANA 3 SENSOR [facility]  Insurance Company: [employer] - Phone [phone] Fax [phone]  Expected CoPay: $    Pharmacy Filling the Rx: [facility] [place] [place] - [address] [place] [address].  Pharmacy Notified: y  Patient Notified: Instructed pharmacy to notify patient once order is ready.

## 2024-08-21 DIAGNOSIS — E11.9 TYPE 2 DIABETES MELLITUS WITHOUT COMPLICATION, WITH LONG-TERM CURRENT USE OF INSULIN (H): ICD-10-CM

## 2024-08-21 DIAGNOSIS — Z79.4 TYPE 2 DIABETES MELLITUS WITHOUT COMPLICATION, WITH LONG-TERM CURRENT USE OF INSULIN (H): ICD-10-CM

## 2024-08-21 NOTE — TELEPHONE ENCOUNTER
Pt should have been out of these more than 90 days ago. Please call pt to clarify if he is currently using these and doses and send to pcp.  Brenda Roberts BSN, RN

## 2024-08-23 RX ORDER — INSULIN GLARGINE 100 [IU]/ML
INJECTION, SOLUTION SUBCUTANEOUS
Qty: 60 ML | Refills: 3 | Status: SHIPPED | OUTPATIENT
Start: 2024-08-23

## 2024-08-23 NOTE — TELEPHONE ENCOUNTER
Patient Contact    Attempt # 3    Was call answered?  No.  Unable to leave message on either HOME or MOBILE numbers.    ~~~~~~~~~~~~~~~~~~~~~~~~    Dr. Mcclure - please advise. Orders pended, but unable to reach pt to discuss the gap in time for her refill request.    Lucinda Santos, RN

## 2024-09-30 ENCOUNTER — TELEPHONE (OUTPATIENT)
Dept: INTERNAL MEDICINE | Facility: CLINIC | Age: 64
End: 2024-09-30
Payer: COMMERCIAL

## 2024-09-30 DIAGNOSIS — E11.9 TYPE 2 DIABETES MELLITUS WITHOUT COMPLICATION, WITH LONG-TERM CURRENT USE OF INSULIN (H): ICD-10-CM

## 2024-09-30 DIAGNOSIS — R80.9 PROTEINURIA, UNSPECIFIED TYPE: Primary | ICD-10-CM

## 2024-09-30 DIAGNOSIS — Z79.4 TYPE 2 DIABETES MELLITUS WITHOUT COMPLICATION, WITH LONG-TERM CURRENT USE OF INSULIN (H): ICD-10-CM

## 2024-09-30 NOTE — TELEPHONE ENCOUNTER
Pt's son Michelle (pt provided verbal consent over the phone to speak with Michelle) called the clinic regarding medications.     He reports that the pt is having side effects such as vaginal itchiness, dizziness, nausea, and headaches almost immediately after starting Jardiance. She would like to switch back to metformin from Jardiance. He states she originally taken off of metformin as it was thought diarrhea was due to this medication, however she now states it was not due to metformin and had other causes.   Medication pended as previously ordered.     He states she has been taking metformin for 1 week or less in place of jardiance.     He will contact pharmacy for refills of other medications.     Routing to PCP for review re:Metformin.     Thank you,  Marley Casas RN

## 2024-10-01 NOTE — TELEPHONE ENCOUNTER
Stop Jardiance since she is   having side effects with the medication  Restart metformin 1000 mg twice daily for diabetes.  Prescription for metformin sent to patient's pharmacy  Continue other medications   ASsist pt with scheduling a  nonfasting blood and urine lab to follow-up on diabetes and urine protein levels in  1 month as ordered.along with an appt with me in clinic a few days after labs in 1 month to assess current blood sugar control with starting back  Metformin rather than Jardiance and to follow-up on blood pressure status. Check blood sugars three a day (before breakfast, supper and bedtime) for 4 days prior to the appointment with me,  write them down and have them available to review with the appointment. Pt to be sure to bring the blood sugars results with her to the appt  May use any current open appointment slot to schedule the appointment except for a preop/hospital follow-up slot  or end of day virtual appt slot.

## 2024-10-03 RX ORDER — PEN NEEDLE, DIABETIC 31 GX5/16"
NEEDLE, DISPOSABLE MISCELLANEOUS
Qty: 400 EACH | Refills: 3 | Status: SHIPPED | OUTPATIENT
Start: 2024-10-03

## 2024-10-03 NOTE — TELEPHONE ENCOUNTER
Patient Contact    Attempt # 1    Was call answered?  Yes.      Relayed provider message. Pt verbalizes understanding and agrees to plan of care. Appointments scheduled per provider instruction. Pt able to verbalize plan of care accurately; med changes, appointments, blood glucose instructions.     Pt requesting refill. Med refill pended, routing to refill pool.

## 2024-11-06 ENCOUNTER — LAB (OUTPATIENT)
Dept: LAB | Facility: CLINIC | Age: 64
End: 2024-11-06
Payer: COMMERCIAL

## 2024-11-06 DIAGNOSIS — E11.9 TYPE 2 DIABETES MELLITUS WITHOUT COMPLICATION, WITH LONG-TERM CURRENT USE OF INSULIN (H): ICD-10-CM

## 2024-11-06 DIAGNOSIS — R80.9 PROTEINURIA, UNSPECIFIED TYPE: ICD-10-CM

## 2024-11-06 DIAGNOSIS — Z79.4 TYPE 2 DIABETES MELLITUS WITHOUT COMPLICATION, WITH LONG-TERM CURRENT USE OF INSULIN (H): ICD-10-CM

## 2024-11-06 LAB
EST. AVERAGE GLUCOSE BLD GHB EST-MCNC: 197 MG/DL
HBA1C MFR BLD: 8.5 % (ref 0–5.6)

## 2024-11-06 PROCEDURE — 36415 COLL VENOUS BLD VENIPUNCTURE: CPT

## 2024-11-06 PROCEDURE — 80048 BASIC METABOLIC PNL TOTAL CA: CPT

## 2024-11-06 PROCEDURE — 82043 UR ALBUMIN QUANTITATIVE: CPT

## 2024-11-06 PROCEDURE — 83036 HEMOGLOBIN GLYCOSYLATED A1C: CPT

## 2024-11-06 PROCEDURE — 82570 ASSAY OF URINE CREATININE: CPT

## 2024-11-07 LAB
ANION GAP SERPL CALCULATED.3IONS-SCNC: 11 MMOL/L (ref 7–15)
BUN SERPL-MCNC: 20.3 MG/DL (ref 8–23)
CALCIUM SERPL-MCNC: 9.4 MG/DL (ref 8.8–10.4)
CHLORIDE SERPL-SCNC: 104 MMOL/L (ref 98–107)
CREAT SERPL-MCNC: 1.14 MG/DL (ref 0.51–0.95)
CREAT UR-MCNC: 390 MG/DL
EGFRCR SERPLBLD CKD-EPI 2021: 53 ML/MIN/1.73M2
GLUCOSE SERPL-MCNC: 152 MG/DL (ref 70–99)
HCO3 SERPL-SCNC: 26 MMOL/L (ref 22–29)
MICROALBUMIN UR-MCNC: 78.8 MG/L
MICROALBUMIN/CREAT UR: 20.21 MG/G CR (ref 0–25)
POTASSIUM SERPL-SCNC: 3.9 MMOL/L (ref 3.4–5.3)
SODIUM SERPL-SCNC: 141 MMOL/L (ref 135–145)

## 2024-11-11 ENCOUNTER — OFFICE VISIT (OUTPATIENT)
Dept: INTERNAL MEDICINE | Facility: CLINIC | Age: 64
End: 2024-11-11
Payer: COMMERCIAL

## 2024-11-11 VITALS
OXYGEN SATURATION: 97 % | TEMPERATURE: 98.1 F | WEIGHT: 192 LBS | DIASTOLIC BLOOD PRESSURE: 101 MMHG | HEIGHT: 63 IN | SYSTOLIC BLOOD PRESSURE: 181 MMHG | HEART RATE: 77 BPM | BODY MASS INDEX: 34.02 KG/M2

## 2024-11-11 DIAGNOSIS — F32.1 CURRENT MODERATE EPISODE OF MAJOR DEPRESSIVE DISORDER WITHOUT PRIOR EPISODE (H): ICD-10-CM

## 2024-11-11 DIAGNOSIS — E11.29 TYPE 2 DIABETES MELLITUS WITH DIABETIC MICROALBUMINURIA, WITH LONG-TERM CURRENT USE OF INSULIN (H): Primary | ICD-10-CM

## 2024-11-11 DIAGNOSIS — I10 HYPERTENSION, UNSPECIFIED TYPE: ICD-10-CM

## 2024-11-11 DIAGNOSIS — E66.811 CLASS 1 OBESITY WITH SERIOUS COMORBIDITY AND BODY MASS INDEX (BMI) OF 34.0 TO 34.9 IN ADULT, UNSPECIFIED OBESITY TYPE: ICD-10-CM

## 2024-11-11 DIAGNOSIS — R80.9 TYPE 2 DIABETES MELLITUS WITH DIABETIC MICROALBUMINURIA, WITH LONG-TERM CURRENT USE OF INSULIN (H): Primary | ICD-10-CM

## 2024-11-11 DIAGNOSIS — Z12.4 CERVICAL CANCER SCREENING: ICD-10-CM

## 2024-11-11 DIAGNOSIS — E11.9 TYPE 2 DIABETES MELLITUS WITHOUT COMPLICATION, WITH LONG-TERM CURRENT USE OF INSULIN (H): ICD-10-CM

## 2024-11-11 DIAGNOSIS — Z79.4 TYPE 2 DIABETES MELLITUS WITHOUT COMPLICATION, WITH LONG-TERM CURRENT USE OF INSULIN (H): ICD-10-CM

## 2024-11-11 DIAGNOSIS — R87.610 ATYPICAL SQUAMOUS CELLS OF UNDETERMINED SIGNIFICANCE ON CYTOLOGIC SMEAR OF CERVIX (ASC-US): ICD-10-CM

## 2024-11-11 DIAGNOSIS — Z79.4 TYPE 2 DIABETES MELLITUS WITH DIABETIC MICROALBUMINURIA, WITH LONG-TERM CURRENT USE OF INSULIN (H): Primary | ICD-10-CM

## 2024-11-11 PROCEDURE — 99214 OFFICE O/P EST MOD 30 MIN: CPT | Performed by: INTERNAL MEDICINE

## 2024-11-11 RX ORDER — LOSARTAN POTASSIUM AND HYDROCHLOROTHIAZIDE 12.5; 1 MG/1; MG/1
1 TABLET ORAL DAILY
Qty: 30 TABLET | Refills: 5 | Status: SHIPPED | OUTPATIENT
Start: 2024-11-11

## 2024-11-11 ASSESSMENT — PATIENT HEALTH QUESTIONNAIRE - PHQ9
SUM OF ALL RESPONSES TO PHQ QUESTIONS 1-9: 9
10. IF YOU CHECKED OFF ANY PROBLEMS, HOW DIFFICULT HAVE THESE PROBLEMS MADE IT FOR YOU TO DO YOUR WORK, TAKE CARE OF THINGS AT HOME, OR GET ALONG WITH OTHER PEOPLE: NOT DIFFICULT AT ALL
SUM OF ALL RESPONSES TO PHQ QUESTIONS 1-9: 9

## 2024-11-11 NOTE — PATIENT INSTRUCTIONS
Eye exam  appt.  Call   [phone] to schedule   Appt with  Diabetes education re: blood sugar management,  work with diabetic diet, etc.  Call  [phone] to schedule   Increase Lantus insulin  to 54 units at bedtime  Call  [phone] or use Mychart to schedule a future lab appointment  non-fasting in 3 months re:  diabetes   Schedule an appointment with [place] [facility] for history abnormal pap  smear and need for colposcopy.  Call [phone] to schedule the  appointment   Moisturizer for dry skin of the feet such as Vanicream, Lubriderm, Vaseline, Cerave, etc. May get over-the-counter  I would recommend  a  Flu vaccine (influenza risk reduction) and Updated covid booster vaccine. Get at a pharmacy if willing to have done  If mental health worsening and wish to consider medication therapy, then let me know  Reduce carbohydrate (sugars, starchy foods such as bread, rice, potato, pasta, etc) intake  in your diet and increase the amount of color on your plate with fruits, vegetables and lean meats.   Get a blood pressure recheck   in  2 weeks at the  [facility] pharmacy or other [place] pharmacies that do blood pressure checks . You will need to go to [email or web address] to schedule the blood pressure check appointment.  Referral to [place] mental [facility] counseling. They will call to schedule. If you don't hear from a representative within 2 business days or wish to call  yourself, please call [phone].

## 2024-11-11 NOTE — PROGRESS NOTES
"  ASSESSMENT:    ***    PLAN:  ***           Subjective   Devi is a 64 year old, presenting for the following health issues:  Diabetes, Hypertension, and Headache    History of Present Illness       Diabetes:   She presents for follow up of diabetes.   She is checking home blood glucose with a continuous glucose monitor.   She checks blood glucose before meals, after meals, before and after meals and at bedtime.  Blood glucose is sometimes over 200 and sometimes under 70. She is aware of hypoglycemia symptoms including dizziness, weakness, blurred vision and confusion.   She is concerned about blood sugar frequently over 200 and low blood sugar, several less than 70 in the past few weeks.   She is having numbness in feet and blurry vision.  The patient has not had a diabetic eye exam in the last 12 months.          Hypertension: She presents for follow up of hypertension.  She does check blood pressure  regularly outside of the clinic. Outside blood pressures have been over 140/90. She follows a low salt diet.     Headaches:   Since the patient's last clinic visit, headaches are: worsened  The patient is getting headaches:  1 times a day  She is not able to do normal daily activities when she has a migraine.  The patient is taking the following rescue/relief medications:  Other   Patient states \"I get some relief\" from the rescue/relief medications.   The patient is taking the following medications to prevent migraines:  Other  In the past 4 weeks, the patient has gone to an Urgent Care or Emergency Room 0 times times due to headaches.    She eats 0-1 servings of fruits and vegetables daily.She consumes 5 sweetened beverage(s) daily.She exercises with enough effort to increase her heart rate 9 or less minutes per day.  She exercises with enough effort to increase her heart rate 3 or less days per week.   She is taking medications regularly.       Most recent lab results reviewed with pt.      Component      " "Latest Ref Rng 7/8/2024  2:26 PM 11/6/2024  1:06 PM 11/6/2024  1:46 PM   Sodium      135 - 145 mmol/L 140  141     Potassium      3.4 - 5.3 mmol/L 4.1  3.9     Carbon Dioxide (CO2)      22 - 29 mmol/L 25  26     Anion Gap      7 - 15 mmol/L 10  11     Urea Nitrogen      8.0 - 23.0 mg/dL 15.0  20.3     Creatinine      0.51 - 0.95 mg/dL 1.02 (H)  1.14 (H)     GFR Estimate      >60 mL/min/1.73m2 61  53 (L)     Calcium      8.8 - 10.4 mg/dL 9.4  9.4     Chloride      98 - 107 mmol/L 105  104     Glucose      70 - 99 mg/dL 143 (H)  152 (H)     Alkaline Phosphatase      40 - 150 U/L 115      AST      0 - 45 U/L 19      ALT      0 - 50 U/L 16      Protein Total      6.4 - 8.3 g/dL 7.3      Albumin      3.5 - 5.2 g/dL 4.2      Bilirubin Total      <=1.2 mg/dL 0.5      Patient Fasting? Yes      Patient Fasting? Yes      Cholesterol      <200 mg/dL 155      Triglycerides      <150 mg/dL 160 (H)      HDL Cholesterol      >=50 mg/dL 42 (L)      LDL Cholesterol Calculated      <=100 mg/dL 81      Non HDL Cholesterol      <130 mg/dL 113      Creatinine Urine      mg/dL 204.0   390.0    Albumin Urine mg/L      mg/L 77.1   78.8    Albumin Urine mg/g Cr      0.00 - 25.00 mg/g Cr 37.79 (H)   20.21    Estimated Average Glucose      <117 mg/dL  197 (H)     Hemoglobin A1C      0.0 - 5.6 % 9.1 (H)  8.5 (H)     TSH      0.30 - 4.20 uIU/mL 3.64         Legend:  (H) High  (L) Low       Additional ROS:   Constitutional, HEENT, Cardiovascular, Pulmonary, GI and , Neuro, MSK and Psych review of systems/symptoms are otherwise negative or unchanged from previous, except as noted above.      OBJECTIVE:  BP (!) 181/101   Pulse 77   Temp 98.1  F (36.7  C) (Temporal)   Ht 1.6 m (5' 3\")   Wt 87.1 kg (192 lb)   LMP 07/10/2009   SpO2 97%   BMI 34.01 kg/m     Estimated body mass index is 34.01 kg/m  as calculated from the following:    Height as of this encounter: 1.6 m (5' 3\").    Weight as of this encounter: 87.1 kg (192 " lb).  {:811565}    (Chart documentation was completed, in part, with [employer] voice-recognition software. Even though reviewed, some grammatical, spelling, and word errors may remain.)    Emanuel Mcclure MD  Internal Medicine Department  [facility]               ALT      0 - 50 U/L 16      Protein Total      6.4 - 8.3 g/dL 7.3      Albumin      3.5 - 5.2 g/dL 4.2      Bilirubin Total      <=1.2 mg/dL 0.5      Patient Fasting? Yes      Patient Fasting? Yes      Cholesterol      <200 mg/dL 155      Triglycerides      <150 mg/dL 160 (H)      HDL Cholesterol      >=50 mg/dL 42 (L)      LDL Cholesterol Calculated      <=100 mg/dL 81      Non HDL Cholesterol      <130 mg/dL 113      Creatinine Urine      mg/dL 204.0   390.0    Albumin Urine mg/L      mg/L 77.1   78.8    Albumin Urine mg/g Cr      0.00 - 25.00 mg/g Cr 37.79 (H)   20.21    Estimated Average Glucose      <117 mg/dL  197 (H)     Hemoglobin A1C      0.0 - 5.6 % 9.1 (H)  8.5 (H)     TSH      0.30 - 4.20 uIU/mL 3.64          7 Day Averages through the day:  Midnight: 179  3AM: 183  6AM 169  9AM 165  Noon 139  3PM 183  6PM 178  9PM 164    Overall 7 day average = 170    90 Day Averages  through the day:  Midnight: 209  3AM: 198  6AM 173  9AM 157  Noon 159  3PM 189  6PM 221  9PM 218    Overall 90 day average = 191    Not taking Jardiance.  Had vaginal itching and headache side effects with that medication.  Has been more stressed recently as well suspended from work for 1 week due to acquired issue.  Patient works as a PCA.  Has not taken her blood pressure medication for 2 days.  Diet poor over the last 2 to 3 days.  Snacking at night with eating chips and eating at [employer].  Denies exertional chest pain, shortness of breath, current abdominal pain.  Patient never scheduled an appointment with gynecology as previously instructed.  History of abnormal Pap smear and overdue for colposcopy.  Also overdue for eye exam.  Did not see diabetes education as previous instructed  PHQ-9=9.  No suicidal ideation  Patient continues to decline COVID or flu vaccinations  Additional ROS:   Constitutional, HEENT, Cardiovascular, Pulmonary, GI and , Neuro, MSK and Psych review of systems/symptoms are otherwise negative or unchanged  "from previous, except as noted above.      OBJECTIVE:  BP (!) 162/90   Pulse 77   Temp 98.1  F (36.7  C) (Temporal)   Ht 1.6 m (5' 3\")   Wt 87.1 kg (192 lb)   LMP 07/10/2009   SpO2 97%   BMI 34.01 kg/m     Estimated body mass index is 34.01 kg/m  as calculated from the following:    Height as of this encounter: 1.6 m (5' 3\").    Weight as of this encounter: 87.1 kg (192 lb).    Neck: no adenopathy. Thyroid normal to palpation. No bruits  Pulm: Lungs clear to auscultation   CV: Regular rates and rhythm  GI: Soft, obese, nontender, Normal active bowel sounds, No hepatosplenomegaly or masses palpable  Ext: Peripheral pulses intact.  Minimal ankle bilateral edema.  Skin of feet dry bilaterally  Neuro: Normal strength and tone, sensory exam grossly normal      (Chart documentation was completed, in part, with [employer] voice-recognition software. Even though reviewed, some grammatical, spelling, and word errors may remain.)    Emanuel Mcclure MD  Internal Medicine Department  [facility]            "

## 2024-12-19 ENCOUNTER — PATIENT OUTREACH (OUTPATIENT)
Dept: CARE COORDINATION | Facility: CLINIC | Age: 64
End: 2024-12-19
Payer: COMMERCIAL

## 2025-01-01 VITALS
BODY MASS INDEX: 33.49 KG/M2 | SYSTOLIC BLOOD PRESSURE: 164 MMHG | WEIGHT: 189 LBS | HEART RATE: 84 BPM | OXYGEN SATURATION: 97 % | DIASTOLIC BLOOD PRESSURE: 92 MMHG | HEIGHT: 63 IN | TEMPERATURE: 98.1 F

## 2025-01-01 VITALS
SYSTOLIC BLOOD PRESSURE: 162 MMHG | WEIGHT: 192 LBS | BODY MASS INDEX: 34.02 KG/M2 | HEART RATE: 77 BPM | DIASTOLIC BLOOD PRESSURE: 90 MMHG | HEIGHT: 63 IN | OXYGEN SATURATION: 97 % | TEMPERATURE: 98.1 F

## 2025-01-01 PROBLEM — R80.9 TYPE 2 DIABETES MELLITUS WITH DIABETIC MICROALBUMINURIA, WITH LONG-TERM CURRENT USE OF INSULIN (H): Status: ACTIVE | Noted: 2025-01-01

## 2025-01-01 PROBLEM — E11.29 TYPE 2 DIABETES MELLITUS WITH DIABETIC MICROALBUMINURIA, WITH LONG-TERM CURRENT USE OF INSULIN (H): Status: ACTIVE | Noted: 2025-01-01

## 2025-01-01 PROBLEM — Z79.4 TYPE 2 DIABETES MELLITUS WITH DIABETIC MICROALBUMINURIA, WITH LONG-TERM CURRENT USE OF INSULIN (H): Status: ACTIVE | Noted: 2025-01-01

## 2025-01-01 PROBLEM — R51.9 CHRONIC NONINTRACTABLE HEADACHE, UNSPECIFIED HEADACHE TYPE: Status: ACTIVE | Noted: 2025-01-01

## 2025-01-01 PROBLEM — G89.29 CHRONIC NONINTRACTABLE HEADACHE, UNSPECIFIED HEADACHE TYPE: Status: ACTIVE | Noted: 2025-01-01

## 2025-01-01 PROBLEM — I10 HYPERTENSION, UNSPECIFIED TYPE: Status: ACTIVE | Noted: 2025-01-01

## 2025-01-01 PROBLEM — E66.811 CLASS 1 OBESITY WITH SERIOUS COMORBIDITY AND BODY MASS INDEX (BMI) OF 33.0 TO 33.9 IN ADULT, UNSPECIFIED OBESITY TYPE: Status: ACTIVE | Noted: 2025-01-01

## 2025-01-01 NOTE — PATIENT INSTRUCTIONS
" Remain off of Amlodipine   Start  losartan-hydrochlorothiazide, 1 tab daily for blood pressure   Start Jardiance 10mg tab, 1 tab daily for diabetes  Referral to Diabetes Education to review diabetic diet and assist with insulin dose adjustments after Jardiance started and teaching re\": CGM blood sugar monitoring. They will call to schedule or you may call [phone]   Referral to Ophthalmology for eye exam. Central schedulers will contact you to schedule an appointment.    Continue other medications  Prescriptions refilled at your pharmacy.    Reduce calorie/carbohydrate (sugar, bread, potato, pasta, rice, alcohol etc)  intake in diet.  Increase color on your plate with vegetables. Increase  frequency of walking or other aerobic exercise as able (goal is daily)  Get a blood pressure recheck   in 3-4 weeks at the  [facility] pharmacy. You will need to go to [email or web address] or call [phone] to schedule the blood pressure check appointment.  Mammogram. This will be done at the [facility] or other [facility] clinic with mammogram services. Call [phone] or use [employer] to schedule.   Future colon cancer screening when willing  I would recommend  a  Prevnar 20 vaccine (pneumonia risk reduction) and updated Pfizer covid booster vaccine at a pharmacy now and flu shot in the Fall.     Schedule an appointment with [facility] for a colposcopy to follow-up on your history of a abnormal pap smear. Call [phone] to schedule the  appointment   Healthcare  Directive discussed and given copy.   Patient to complete and return to clinic  Call  [phone] or use [employer] to schedule a future lab appointment  non-fasting in 3 months to recheck A1C for diabetes.   Pt was informed regarding extra E&M billing for management of new or established medical issues not related to today's wellness/screening visit  "

## 2025-02-01 DIAGNOSIS — Z79.4 TYPE 2 DIABETES MELLITUS WITHOUT COMPLICATION, WITH LONG-TERM CURRENT USE OF INSULIN (H): ICD-10-CM

## 2025-02-01 DIAGNOSIS — E11.9 TYPE 2 DIABETES MELLITUS WITHOUT COMPLICATION, WITH LONG-TERM CURRENT USE OF INSULIN (H): ICD-10-CM

## 2025-02-20 ENCOUNTER — TELEPHONE (OUTPATIENT)
Dept: INTERNAL MEDICINE | Facility: CLINIC | Age: 65
End: 2025-02-20
Payer: COMMERCIAL

## 2025-02-20 DIAGNOSIS — R80.9 TYPE 2 DIABETES MELLITUS WITH DIABETIC MICROALBUMINURIA, WITH LONG-TERM CURRENT USE OF INSULIN (H): Primary | ICD-10-CM

## 2025-02-20 DIAGNOSIS — E11.29 TYPE 2 DIABETES MELLITUS WITH DIABETIC MICROALBUMINURIA, WITH LONG-TERM CURRENT USE OF INSULIN (H): Primary | ICD-10-CM

## 2025-02-20 DIAGNOSIS — Z79.4 TYPE 2 DIABETES MELLITUS WITH DIABETIC MICROALBUMINURIA, WITH LONG-TERM CURRENT USE OF INSULIN (H): Primary | ICD-10-CM

## 2025-02-20 NOTE — TELEPHONE ENCOUNTER
[facility] faxed Rx request for CGM. Pt has medicare part B. Per insurance   she needs order for CGM every 6 months. Order needs to have Dx code Pharmacy also needs pertinent chart notes faxed to pharmacy [phone] every 6 months.     CGM order pended. Please sign if agree with orders and route to TC to fax chart notes. Thank you.

## 2025-02-25 RX ORDER — HYDROCHLOROTHIAZIDE 12.5 MG/1
CAPSULE ORAL
Qty: 6 EACH | Refills: 3 | Status: SHIPPED | OUTPATIENT
Start: 2025-02-25

## 2025-02-25 RX ORDER — KETOROLAC TROMETHAMINE 30 MG/ML
1 INJECTION, SOLUTION INTRAMUSCULAR; INTRAVENOUS ONCE
Qty: 1 EACH | Refills: 0 | Status: SHIPPED | OUTPATIENT
Start: 2025-02-25 | End: 2025-02-25

## 2025-02-25 NOTE — TELEPHONE ENCOUNTER
Too much extra work with staff having to print off and fax medical records to [facility] every 6 months to meet Medicare requirements having CGM filled through [facility].  Will instead have CGM reader and sensors filled through [facility] we have access to medical chart.  Patient up-to-date for visit 11/11/2025 with note in chart.  Inform patient prescriptions will instead be sent to her through [facility].  Patient also overdue for follow-up diabetic labs.  Assist patient in scheduling nonfasting lab appointment

## 2025-02-26 NOTE — TELEPHONE ENCOUNTER
Called and spoke with pt to relay provider note below.  She verbalized understanding.   She will contact her insurance to ensure that this pharmacy will be covered for her as she was told there are restrictions.     Assisted with scheduling lab appt.     Mar 02, 2025 2:15 PM  LAB with [facility]  [facility] ([facility] [phone]     Thank you,  Marley Casas RN

## 2025-03-04 ENCOUNTER — TELEPHONE (OUTPATIENT)
Dept: INTERNAL MEDICINE | Facility: CLINIC | Age: 65
End: 2025-03-04
Payer: COMMERCIAL

## 2025-03-04 NOTE — TELEPHONE ENCOUNTER
Hello!    This message is to notify you that the Diabetes Care Services team has completed their benefit check for this patient's FREESTYLE LANA 3 PLUS SENSOR AND READER. We have notified the patient of their approval. If you have any questions, please let us know!    Thank you!    Diabetes Care Services Team  [facility]  [address] [place] [address]  Phone: [phone]  Fax; [phone]

## 2025-05-15 DIAGNOSIS — I10 HYPERTENSION, UNSPECIFIED TYPE: ICD-10-CM

## 2025-05-16 RX ORDER — LOSARTAN POTASSIUM AND HYDROCHLOROTHIAZIDE 12.5; 1 MG/1; MG/1
1 TABLET ORAL DAILY
Qty: 30 TABLET | Refills: 1 | Status: SHIPPED | OUTPATIENT
Start: 2025-05-16

## 2025-05-16 NOTE — TELEPHONE ENCOUNTER
Patient last seen in November.  Uncontrolled blood pressure at that time the patient had been missing doses of medication therapy.  Diabetes also not controlled at that time having gone off of Jardiance.  Insulin dose changes were made at that point with plans to repeat labs 3 months later but never done.  Call patient and assist in scheduling a follow-up appointment with me in clinic in the next 1 month preceded by a nonfasting lab appointment sometime in the week prior so I can review lab results with patient when seen back in clinic.  Patient to bring in freestyle mike plus  CGM reader to the appt to review recent blood sugars. Pt to be sure to be checking blood sugars at least 3 times a day in the week prior to the appointment with me so data is available to review on the CGM reader.  Blood pressure medication refilled for 30 days for now with 1 additional refill.  Will consider more long-term refills of medications after blood pressure reviewed and labs reviewed

## 2025-05-21 NOTE — TELEPHONE ENCOUNTER
Call patient and assist in scheduling a follow-up appointment with me in clinic in the next 1 month preceded by a nonfasting lab appointment sometime in the week prior so I can review lab results with patient when seen back in clinic.  Patient to bring in freestyle mike plus  CGM reader to the appt to review recent blood sugars. Pt to be sure to be checking blood sugars at least 3 times a day in the week prior to the appointment with me so data is available to review on the CGM reader

## 2025-05-29 ENCOUNTER — TELEPHONE (OUTPATIENT)
Dept: INTERNAL MEDICINE | Facility: CLINIC | Age: 65
End: 2025-05-29
Payer: COMMERCIAL

## 2025-05-29 NOTE — TELEPHONE ENCOUNTER
Patient Quality Outreach    Patient is due for the following:   Hypertension -  BP check    Action(s) Taken:   Patient has upcoming appointment, these items will be addressed at that time.    Type of outreach:    None pt appt already scheduled    Questions for provider review:    None         Radha Gates  Chart routed to None.

## 2025-06-04 NOTE — TELEPHONE ENCOUNTER
Appointments have now been scheduled.  Medications refilled for 90 days.   Patient to have fasting labs done and follow-up with me as scheduled. Check blood sugars twice a day (before breakfast and bedtime) for 4 days prior to the appointment with me, write them down and have them available to review with the appointment   [id number]

## 2025-06-10 ENCOUNTER — LAB (OUTPATIENT)
Dept: LAB | Facility: CLINIC | Age: 65
End: 2025-06-10
Payer: MEDICARE

## 2025-06-10 DIAGNOSIS — R80.9 TYPE 2 DIABETES MELLITUS WITH DIABETIC MICROALBUMINURIA, WITH LONG-TERM CURRENT USE OF INSULIN (H): ICD-10-CM

## 2025-06-10 DIAGNOSIS — E11.29 TYPE 2 DIABETES MELLITUS WITH DIABETIC MICROALBUMINURIA, WITH LONG-TERM CURRENT USE OF INSULIN (H): ICD-10-CM

## 2025-06-10 DIAGNOSIS — R06.02 SOB (SHORTNESS OF BREATH): ICD-10-CM

## 2025-06-10 DIAGNOSIS — E78.5 HYPERLIPIDEMIA WITH TARGET LDL LESS THAN 100: ICD-10-CM

## 2025-06-10 DIAGNOSIS — Z79.4 TYPE 2 DIABETES MELLITUS WITH DIABETIC MICROALBUMINURIA, WITH LONG-TERM CURRENT USE OF INSULIN (H): ICD-10-CM

## 2025-06-10 LAB
EST. AVERAGE GLUCOSE BLD GHB EST-MCNC: 200 MG/DL
HBA1C MFR BLD: 8.6 % (ref 0–5.6)

## 2025-06-10 PROCEDURE — 83880 ASSAY OF NATRIURETIC PEPTIDE: CPT

## 2025-06-10 PROCEDURE — 85018 HEMOGLOBIN: CPT

## 2025-06-10 PROCEDURE — 83036 HEMOGLOBIN GLYCOSYLATED A1C: CPT

## 2025-06-10 PROCEDURE — 36415 COLL VENOUS BLD VENIPUNCTURE: CPT

## 2025-06-10 PROCEDURE — 80048 BASIC METABOLIC PNL TOTAL CA: CPT

## 2025-06-10 PROCEDURE — 80061 LIPID PANEL: CPT

## 2025-06-11 ENCOUNTER — OFFICE VISIT (OUTPATIENT)
Dept: INTERNAL MEDICINE | Facility: CLINIC | Age: 65
End: 2025-06-11
Payer: MEDICARE

## 2025-06-11 ENCOUNTER — RESULTS FOLLOW-UP (OUTPATIENT)
Dept: INTERNAL MEDICINE | Facility: CLINIC | Age: 65
End: 2025-06-11

## 2025-06-11 DIAGNOSIS — Z79.4 TYPE 2 DIABETES MELLITUS WITH DIABETIC MICROALBUMINURIA, WITH LONG-TERM CURRENT USE OF INSULIN (H): Primary | ICD-10-CM

## 2025-06-11 DIAGNOSIS — N18.31 STAGE 3A CHRONIC KIDNEY DISEASE (H): ICD-10-CM

## 2025-06-11 DIAGNOSIS — I10 HYPERTENSION, UNSPECIFIED TYPE: ICD-10-CM

## 2025-06-11 DIAGNOSIS — R06.02 SOB (SHORTNESS OF BREATH): ICD-10-CM

## 2025-06-11 DIAGNOSIS — R80.9 TYPE 2 DIABETES MELLITUS WITH DIABETIC MICROALBUMINURIA, WITH LONG-TERM CURRENT USE OF INSULIN (H): Primary | ICD-10-CM

## 2025-06-11 DIAGNOSIS — E78.5 HYPERLIPIDEMIA WITH TARGET LDL LESS THAN 100: ICD-10-CM

## 2025-06-11 DIAGNOSIS — E11.29 TYPE 2 DIABETES MELLITUS WITH DIABETIC MICROALBUMINURIA, WITH LONG-TERM CURRENT USE OF INSULIN (H): Primary | ICD-10-CM

## 2025-06-11 LAB
ANION GAP SERPL CALCULATED.3IONS-SCNC: 11 MMOL/L (ref 7–15)
BUN SERPL-MCNC: 14.7 MG/DL (ref 8–23)
CALCIUM SERPL-MCNC: 9.1 MG/DL (ref 8.8–10.4)
CHLORIDE SERPL-SCNC: 104 MMOL/L (ref 98–107)
CHOLEST SERPL-MCNC: 151 MG/DL
CREAT SERPL-MCNC: 1.12 MG/DL (ref 0.51–0.95)
EGFRCR SERPLBLD CKD-EPI 2021: 54 ML/MIN/1.73M2
GLUCOSE SERPL-MCNC: 157 MG/DL (ref 70–99)
HCO3 SERPL-SCNC: 24 MMOL/L (ref 22–29)
HDLC SERPL-MCNC: 41 MG/DL
HGB BLD-MCNC: 11.1 G/DL (ref 11.7–15.7)
LDLC SERPL CALC-MCNC: 79 MG/DL
MCV RBC AUTO: 88 FL (ref 78–100)
NONHDLC SERPL-MCNC: 110 MG/DL
NT-PROBNP SERPL-MCNC: 137 PG/ML (ref 0–353)
POTASSIUM SERPL-SCNC: 4.4 MMOL/L (ref 3.4–5.3)
SODIUM SERPL-SCNC: 139 MMOL/L (ref 135–145)
TRIGL SERPL-MCNC: 157 MG/DL

## 2025-06-11 PROCEDURE — 3077F SYST BP >= 140 MM HG: CPT | Performed by: INTERNAL MEDICINE

## 2025-06-11 PROCEDURE — 3080F DIAST BP >= 90 MM HG: CPT | Performed by: INTERNAL MEDICINE

## 2025-06-11 PROCEDURE — 99214 OFFICE O/P EST MOD 30 MIN: CPT | Performed by: INTERNAL MEDICINE

## 2025-06-11 RX ORDER — HYDROCHLOROTHIAZIDE 12.5 MG/1
CAPSULE ORAL
Qty: 6 EACH | Refills: 4 | Status: SHIPPED | OUTPATIENT
Start: 2025-06-11

## 2025-06-11 RX ORDER — LOSARTAN POTASSIUM AND HYDROCHLOROTHIAZIDE 12.5; 1 MG/1; MG/1
1 TABLET ORAL DAILY
Qty: 30 TABLET | Refills: 1 | Status: CANCELLED | OUTPATIENT
Start: 2025-06-11

## 2025-06-12 ENCOUNTER — PATIENT OUTREACH (OUTPATIENT)
Dept: CARE COORDINATION | Facility: CLINIC | Age: 65
End: 2025-06-12
Payer: MEDICARE

## 2025-06-16 ENCOUNTER — PATIENT OUTREACH (OUTPATIENT)
Dept: CARE COORDINATION | Facility: CLINIC | Age: 65
End: 2025-06-16
Payer: MEDICARE

## 2025-07-11 DIAGNOSIS — I10 HYPERTENSION, UNSPECIFIED TYPE: ICD-10-CM

## 2025-07-11 DIAGNOSIS — K21.9 GASTROESOPHAGEAL REFLUX DISEASE WITHOUT ESOPHAGITIS: ICD-10-CM

## 2025-07-11 DIAGNOSIS — Z79.4 TYPE 2 DIABETES MELLITUS WITHOUT COMPLICATION, WITH LONG-TERM CURRENT USE OF INSULIN (H): ICD-10-CM

## 2025-07-11 DIAGNOSIS — E11.9 TYPE 2 DIABETES MELLITUS WITHOUT COMPLICATION, WITH LONG-TERM CURRENT USE OF INSULIN (H): ICD-10-CM

## 2025-07-11 DIAGNOSIS — E78.5 HYPERLIPIDEMIA WITH TARGET LDL LESS THAN 100: ICD-10-CM

## 2025-07-11 RX ORDER — OMEPRAZOLE 20 MG/1
20 CAPSULE, DELAYED RELEASE ORAL DAILY
Qty: 90 CAPSULE | Refills: 2 | Status: SHIPPED | OUTPATIENT
Start: 2025-07-11

## 2025-07-11 RX ORDER — BLOOD SUGAR DIAGNOSTIC
STRIP MISCELLANEOUS
Qty: 300 STRIP | Refills: 2 | Status: SHIPPED | OUTPATIENT
Start: 2025-07-11

## 2025-07-11 RX ORDER — ATORVASTATIN CALCIUM 10 MG/1
10 TABLET, FILM COATED ORAL DAILY
Qty: 90 TABLET | Refills: 2 | Status: SHIPPED | OUTPATIENT
Start: 2025-07-11

## 2025-07-15 RX ORDER — LOSARTAN POTASSIUM AND HYDROCHLOROTHIAZIDE 12.5; 1 MG/1; MG/1
1 TABLET ORAL DAILY
Qty: 30 TABLET | Refills: 0 | Status: SHIPPED | OUTPATIENT
Start: 2025-07-15

## 2025-07-15 NOTE — TELEPHONE ENCOUNTER
Call patient.  Was seen 6/11/2025.  Blood pressure quite elevated at that time.  Patient was instructed then to restart losartan/hydrochlorothiazide and get a blood pressure check 1 week later at the [facility] pharmacy.  However, patient did not have that done.  Needs blood pressure assessment to see how her blood pressure is doing with the losartan/hydrochlorothiazide restart.  Blood pressure medicine refilled for 30 days only for now.  Metformin filled for longer period.  Will consider refills of the losartan/hydrochlorothiazide after blood pressure is rechecked to confirm dose is correct.  Patient to contact the [facility] by phone or go to the website [email or web address] to schedule the blood pressure check with them in the next week so results can be sent to me

## 2025-07-16 NOTE — TELEPHONE ENCOUNTER
Michelle/pt returned call to clinic. Relayed provider message. Pt provided verbal approval to discuss PHI with Michelle. Discussed provider message in note below. Pt agrees to check blood pressure this week.      Billy 3 sensors have not yet received, Michelle will contact pharmacy for refill.

## 2025-07-16 NOTE — TELEPHONE ENCOUNTER
Patient Contact    Attempt # 1    Was call answered?  No.  Left message on voicemail with information to call me back.    Thank you,  Marley Casas RN

## 2025-08-10 VITALS
SYSTOLIC BLOOD PRESSURE: 180 MMHG | OXYGEN SATURATION: 97 % | DIASTOLIC BLOOD PRESSURE: 90 MMHG | RESPIRATION RATE: 16 BRPM | WEIGHT: 196.3 LBS | BODY MASS INDEX: 34.78 KG/M2 | TEMPERATURE: 97.6 F | HEART RATE: 87 BPM | HEIGHT: 63 IN

## 2025-08-10 PROBLEM — E03.9 HYPOTHYROIDISM, UNSPECIFIED TYPE: Status: RESOLVED | Noted: 2017-01-14 | Resolved: 2025-08-10

## 2025-08-10 RX ORDER — INSULIN GLARGINE 100 [IU]/ML
INJECTION, SOLUTION SUBCUTANEOUS
Qty: 45 ML | Refills: 0 | Status: SHIPPED | OUTPATIENT
Start: 2025-08-10

## 2025-08-29 DIAGNOSIS — I10 HYPERTENSION, UNSPECIFIED TYPE: ICD-10-CM

## 2025-09-02 RX ORDER — LOSARTAN POTASSIUM AND HYDROCHLOROTHIAZIDE 12.5; 1 MG/1; MG/1
1 TABLET ORAL DAILY
Qty: 30 TABLET | Refills: 1 | Status: SHIPPED | OUTPATIENT
Start: 2025-09-02